# Patient Record
Sex: FEMALE | Race: WHITE | Employment: OTHER | URBAN - METROPOLITAN AREA
[De-identification: names, ages, dates, MRNs, and addresses within clinical notes are randomized per-mention and may not be internally consistent; named-entity substitution may affect disease eponyms.]

---

## 2017-01-09 ENCOUNTER — TELEPHONE (OUTPATIENT)
Dept: PSYCHIATRY | Age: 82
End: 2017-01-09

## 2017-01-09 ENCOUNTER — OFFICE VISIT (OUTPATIENT)
Dept: INTERNAL MEDICINE CLINIC | Age: 82
End: 2017-01-09

## 2017-01-09 VITALS
HEART RATE: 72 BPM | SYSTOLIC BLOOD PRESSURE: 136 MMHG | HEIGHT: 61 IN | BODY MASS INDEX: 18.54 KG/M2 | WEIGHT: 98.2 LBS | DIASTOLIC BLOOD PRESSURE: 72 MMHG

## 2017-01-09 DIAGNOSIS — M81.0 OSTEOPOROSIS: ICD-10-CM

## 2017-01-09 DIAGNOSIS — J43.2 CENTRILOBULAR EMPHYSEMA (HCC): ICD-10-CM

## 2017-01-09 DIAGNOSIS — I73.9 PERIPHERAL VASCULAR DISEASE (HCC): ICD-10-CM

## 2017-01-09 DIAGNOSIS — C34.91 PRIMARY ADENOCARCINOMA OF RIGHT LUNG (HCC): ICD-10-CM

## 2017-01-09 DIAGNOSIS — J44.9 OBSTRUCTIVE CHRONIC BRONCHITIS WITHOUT EXACERBATION (HCC): ICD-10-CM

## 2017-01-09 DIAGNOSIS — F17.200 TOBACCO USE DISORDER: ICD-10-CM

## 2017-01-09 DIAGNOSIS — J31.0 CHRONIC RHINITIS: ICD-10-CM

## 2017-01-09 DIAGNOSIS — Z79.899 HIGH RISK MEDICATION USE: ICD-10-CM

## 2017-01-09 DIAGNOSIS — J44.9 COPD, SEVERE (HCC): Primary | ICD-10-CM

## 2017-01-09 DIAGNOSIS — M79.2 NEUROPATHIC PAIN DUE TO RADIATION: ICD-10-CM

## 2017-01-09 DIAGNOSIS — E78.2 MIXED HYPERLIPIDEMIA: ICD-10-CM

## 2017-01-09 DIAGNOSIS — I71.40 ABDOMINAL AORTIC ANEURYSM WITHOUT RUPTURE: ICD-10-CM

## 2017-01-09 LAB
BASOPHILS ABSOLUTE: 0 K/UL (ref 0–0.2)
BASOPHILS RELATIVE PERCENT: 0.3 %
EOSINOPHILS ABSOLUTE: 0.1 K/UL (ref 0–0.6)
EOSINOPHILS RELATIVE PERCENT: 1.1 %
HCT VFR BLD CALC: 43.3 % (ref 36–48)
HEMOGLOBIN: 14.3 G/DL (ref 12–16)
LYMPHOCYTES ABSOLUTE: 1.7 K/UL (ref 1–5.1)
LYMPHOCYTES RELATIVE PERCENT: 19.8 %
MCH RBC QN AUTO: 29.5 PG (ref 26–34)
MCHC RBC AUTO-ENTMCNC: 32.9 G/DL (ref 31–36)
MCV RBC AUTO: 89.8 FL (ref 80–100)
MONOCYTES ABSOLUTE: 0.6 K/UL (ref 0–1.3)
MONOCYTES RELATIVE PERCENT: 7.2 %
NEUTROPHILS ABSOLUTE: 6.3 K/UL (ref 1.7–7.7)
NEUTROPHILS RELATIVE PERCENT: 71.6 %
PDW BLD-RTO: 13.6 % (ref 12.4–15.4)
PLATELET # BLD: 210 K/UL (ref 135–450)
PMV BLD AUTO: 9.1 FL (ref 5–10.5)
RBC # BLD: 4.83 M/UL (ref 4–5.2)
VITAMIN D 25-HYDROXY: 44.3 NG/ML
WBC # BLD: 8.8 K/UL (ref 4–11)

## 2017-01-09 PROCEDURE — 99214 OFFICE O/P EST MOD 30 MIN: CPT | Performed by: FAMILY MEDICINE

## 2017-01-09 RX ORDER — DOXYCYCLINE 100 MG/1
100 CAPSULE ORAL 2 TIMES DAILY
Qty: 20 CAPSULE | Refills: 0 | Status: SHIPPED | OUTPATIENT
Start: 2017-01-09 | End: 2017-01-11

## 2017-01-09 RX ORDER — MOMETASONE FUROATE 50 UG/1
SPRAY, METERED NASAL
Qty: 1 INHALER | Refills: 5 | Status: SHIPPED | OUTPATIENT
Start: 2017-01-09 | End: 2017-04-03

## 2017-01-09 RX ORDER — FLUTICASONE PROPIONATE 50 MCG
SPRAY, SUSPENSION (ML) NASAL
Qty: 1 BOTTLE | Refills: 12 | Status: SHIPPED | OUTPATIENT
Start: 2017-01-09 | End: 2017-07-10 | Stop reason: ALTCHOICE

## 2017-01-09 ASSESSMENT — ENCOUNTER SYMPTOMS
VOMITING: 0
CONSTIPATION: 0
NAUSEA: 0
DIARRHEA: 0

## 2017-01-10 PROBLEM — K75.81 NONALCOHOLIC STEATOHEPATITIS (NASH): Status: ACTIVE | Noted: 2017-01-10

## 2017-01-10 PROBLEM — C34.91 PRIMARY ADENOCARCINOMA OF RIGHT LUNG (HCC): Status: ACTIVE | Noted: 2017-01-10

## 2017-01-10 LAB
A/G RATIO: 1.6 (ref 1.1–2.2)
ALBUMIN SERPL-MCNC: 4.3 G/DL (ref 3.4–5)
ALP BLD-CCNC: 128 U/L (ref 40–129)
ALT SERPL-CCNC: 23 U/L (ref 10–40)
ANION GAP SERPL CALCULATED.3IONS-SCNC: 18 MMOL/L (ref 3–16)
AST SERPL-CCNC: 52 U/L (ref 15–37)
BILIRUB SERPL-MCNC: 0.5 MG/DL (ref 0–1)
BUN BLDV-MCNC: 9 MG/DL (ref 7–20)
CALCIUM SERPL-MCNC: 9.9 MG/DL (ref 8.3–10.6)
CHLORIDE BLD-SCNC: 98 MMOL/L (ref 99–110)
CHOLESTEROL, TOTAL: 122 MG/DL (ref 0–199)
CO2: 25 MMOL/L (ref 21–32)
CREAT SERPL-MCNC: 0.7 MG/DL (ref 0.6–1.2)
GFR AFRICAN AMERICAN: >60
GFR NON-AFRICAN AMERICAN: >60
GLOBULIN: 2.7 G/DL
GLUCOSE BLD-MCNC: 93 MG/DL (ref 70–99)
HDLC SERPL-MCNC: 46 MG/DL (ref 40–60)
LDL CHOLESTEROL CALCULATED: 47 MG/DL
POTASSIUM SERPL-SCNC: 4.6 MMOL/L (ref 3.5–5.1)
SODIUM BLD-SCNC: 141 MMOL/L (ref 136–145)
TOTAL PROTEIN: 7 G/DL (ref 6.4–8.2)
TRIGL SERPL-MCNC: 144 MG/DL (ref 0–150)
VLDLC SERPL CALC-MCNC: 29 MG/DL

## 2017-01-10 ASSESSMENT — ENCOUNTER SYMPTOMS
SORE THROAT: 0
RHINORRHEA: 1
SHORTNESS OF BREATH: 1
FACIAL SWELLING: 0
WHEEZING: 0
COUGH: 1
CHEST TIGHTNESS: 0
SINUS PRESSURE: 0

## 2017-01-11 ENCOUNTER — TELEPHONE (OUTPATIENT)
Dept: INTERNAL MEDICINE CLINIC | Age: 82
End: 2017-01-11

## 2017-02-13 RX ORDER — TIOTROPIUM BROMIDE 18 UG/1
CAPSULE ORAL; RESPIRATORY (INHALATION)
Qty: 30 CAPSULE | Refills: 6 | Status: SHIPPED | OUTPATIENT
Start: 2017-02-13 | End: 2017-07-10 | Stop reason: ALTCHOICE

## 2017-02-28 ENCOUNTER — CLINICAL DOCUMENTATION (OUTPATIENT)
Dept: INTERNAL MEDICINE CLINIC | Age: 82
End: 2017-02-28

## 2017-02-28 NOTE — PROGRESS NOTES
I am, Mary Galloway, a Medical Assistant with [facility]. We are working to help abstract data from payers. A Home visit evaluation was obtained from [facility]. In reviewing the visit information are discrepancy(s) in [facility] record. I've also scanned this to  for your review.    The following item(s) were noted to be different on the home visit report compared to our data in Epic:  1. diagnosis        Thank you,   Nate Quevedo

## 2017-04-03 ENCOUNTER — OFFICE VISIT (OUTPATIENT)
Dept: INTERNAL MEDICINE CLINIC | Age: 82
End: 2017-04-03

## 2017-04-03 VITALS
DIASTOLIC BLOOD PRESSURE: 92 MMHG | WEIGHT: 100 LBS | HEART RATE: 80 BPM | HEIGHT: 60 IN | BODY MASS INDEX: 19.63 KG/M2 | SYSTOLIC BLOOD PRESSURE: 140 MMHG

## 2017-04-03 DIAGNOSIS — C34.2 PRIMARY CANCER OF RIGHT MIDDLE LOBE OF LUNG (HCC): ICD-10-CM

## 2017-04-03 DIAGNOSIS — E78.2 MIXED HYPERLIPIDEMIA: ICD-10-CM

## 2017-04-03 DIAGNOSIS — J44.9 COPD, SEVERE (HCC): ICD-10-CM

## 2017-04-03 DIAGNOSIS — M79.2 NEUROPATHIC PAIN DUE TO RADIATION: Primary | ICD-10-CM

## 2017-04-03 DIAGNOSIS — J96.11 CHRONIC HYPOXEMIC RESPIRATORY FAILURE (HCC): ICD-10-CM

## 2017-04-03 DIAGNOSIS — F41.1 GAD (GENERALIZED ANXIETY DISORDER): ICD-10-CM

## 2017-04-03 DIAGNOSIS — C34.91 PRIMARY ADENOCARCINOMA OF RIGHT LUNG (HCC): ICD-10-CM

## 2017-04-03 PROCEDURE — 99213 OFFICE O/P EST LOW 20 MIN: CPT | Performed by: FAMILY MEDICINE

## 2017-04-03 RX ORDER — MIRTAZAPINE 7.5 MG/1
TABLET, FILM COATED ORAL
Qty: 90 TABLET | Refills: 1 | Status: SHIPPED | OUTPATIENT
Start: 2017-04-03 | End: 2017-06-23 | Stop reason: SDUPTHER

## 2017-04-03 ASSESSMENT — ENCOUNTER SYMPTOMS
VOMITING: 0
SHORTNESS OF BREATH: 0
NAUSEA: 0
DIARRHEA: 0
CONSTIPATION: 0

## 2017-04-04 ENCOUNTER — HOSPITAL ENCOUNTER (OUTPATIENT)
Dept: GENERAL RADIOLOGY | Age: 82
Discharge: HOME | End: 2017-04-04
Attending: FAMILY MEDICINE | Admitting: FAMILY MEDICINE

## 2017-04-04 DIAGNOSIS — M81.0 OSTEOPOROSIS: ICD-10-CM

## 2017-04-04 DIAGNOSIS — M81.0 AGE-RELATED OSTEOPOROSIS WITHOUT CURRENT PATHOLOGICAL FRACTURE: ICD-10-CM

## 2017-04-19 ENCOUNTER — OFFICE VISIT (OUTPATIENT)
Dept: PULMONOLOGY | Age: 82
End: 2017-04-19

## 2017-04-19 ENCOUNTER — HOSPITAL ENCOUNTER (OUTPATIENT)
Dept: OTHER | Age: 82
Discharge: HOME | End: 2017-04-19
Attending: INTERNAL MEDICINE | Admitting: INTERNAL MEDICINE

## 2017-04-19 VITALS — HEART RATE: 100 BPM | DIASTOLIC BLOOD PRESSURE: 72 MMHG | SYSTOLIC BLOOD PRESSURE: 138 MMHG | OXYGEN SATURATION: 95 %

## 2017-04-19 DIAGNOSIS — J43.2 CENTRILOBULAR EMPHYSEMA (HCC): ICD-10-CM

## 2017-04-19 DIAGNOSIS — F17.200 TOBACCO USE DISORDER: Chronic | ICD-10-CM

## 2017-04-19 DIAGNOSIS — R06.02 SOB (SHORTNESS OF BREATH): Primary | ICD-10-CM

## 2017-04-19 DIAGNOSIS — C34.2 PRIMARY CANCER OF RIGHT MIDDLE LOBE OF LUNG (HCC): ICD-10-CM

## 2017-04-19 DIAGNOSIS — R06.02 SOB (SHORTNESS OF BREATH): ICD-10-CM

## 2017-04-19 DIAGNOSIS — J44.9 COPD, SEVERE (HCC): ICD-10-CM

## 2017-04-19 LAB
BASOPHILS ABSOLUTE: 0.1 K/UL (ref 0–0.2)
BASOPHILS RELATIVE PERCENT: 0.7 %
EOSINOPHILS ABSOLUTE: 0.1 K/UL (ref 0–0.6)
EOSINOPHILS RELATIVE PERCENT: 0.7 %
HCT VFR BLD CALC: 46.7 % (ref 36–48)
HEMOGLOBIN: 15.3 G/DL (ref 12–16)
LYMPHOCYTES ABSOLUTE: 1.8 K/UL (ref 1–5.1)
LYMPHOCYTES RELATIVE PERCENT: 22 %
MCH RBC QN AUTO: 29.4 PG (ref 26–34)
MCHC RBC AUTO-ENTMCNC: 32.7 G/DL (ref 31–36)
MCV RBC AUTO: 89.9 FL (ref 80–100)
MONOCYTES ABSOLUTE: 0.8 K/UL (ref 0–1.3)
MONOCYTES RELATIVE PERCENT: 10.4 %
NEUTROPHILS ABSOLUTE: 5.4 K/UL (ref 1.7–7.7)
NEUTROPHILS RELATIVE PERCENT: 66.2 %
PDW BLD-RTO: 13.7 % (ref 12.4–15.4)
PLATELET # BLD: 212 K/UL (ref 135–450)
PMV BLD AUTO: 9.4 FL (ref 5–10.5)
RBC # BLD: 5.19 M/UL (ref 4–5.2)
WBC # BLD: 8.1 K/UL (ref 4–11)

## 2017-04-19 PROCEDURE — 99214 OFFICE O/P EST MOD 30 MIN: CPT | Performed by: INTERNAL MEDICINE

## 2017-04-19 RX ORDER — PREDNISONE 10 MG/1
TABLET ORAL
Qty: 30 TABLET | Refills: 0 | Status: SHIPPED | OUTPATIENT
Start: 2017-04-19 | End: 2017-04-29

## 2017-04-19 RX ORDER — DOXYCYCLINE HYCLATE 100 MG/1
100 CAPSULE ORAL DAILY
Qty: 10 CAPSULE | Refills: 3 | Status: SHIPPED | OUTPATIENT
Start: 2017-04-19 | End: 2017-04-29

## 2017-05-28 DIAGNOSIS — F41.1 GAD (GENERALIZED ANXIETY DISORDER): ICD-10-CM

## 2017-05-30 RX ORDER — MIRTAZAPINE 7.5 MG/1
TABLET, FILM COATED ORAL
Qty: 90 TABLET | Refills: 0 | Status: SHIPPED | OUTPATIENT
Start: 2017-05-30 | End: 2018-04-20 | Stop reason: DRUGHIGH

## 2017-06-24 PROBLEM — K59.00 CONSTIPATION: Status: ACTIVE | Noted: 2017-06-24

## 2017-06-26 ENCOUNTER — TELEPHONE (OUTPATIENT)
Dept: INTERNAL MEDICINE CLINIC | Age: 82
End: 2017-06-26

## 2017-06-29 ENCOUNTER — TELEPHONE (OUTPATIENT)
Dept: INTERNAL MEDICINE CLINIC | Age: 82
End: 2017-06-29

## 2017-07-10 ENCOUNTER — OFFICE VISIT (OUTPATIENT)
Dept: INTERNAL MEDICINE CLINIC | Age: 82
End: 2017-07-10

## 2017-07-10 VITALS
BODY MASS INDEX: 18.03 KG/M2 | HEART RATE: 80 BPM | DIASTOLIC BLOOD PRESSURE: 82 MMHG | WEIGHT: 98 LBS | SYSTOLIC BLOOD PRESSURE: 130 MMHG | HEIGHT: 62 IN

## 2017-07-10 DIAGNOSIS — M79.2 NEUROPATHIC PAIN DUE TO RADIATION: ICD-10-CM

## 2017-07-10 DIAGNOSIS — J31.0 CHRONIC RHINITIS: ICD-10-CM

## 2017-07-10 DIAGNOSIS — J44.9 COPD, SEVERE (HCC): ICD-10-CM

## 2017-07-10 DIAGNOSIS — Z09 HOSPITAL DISCHARGE FOLLOW-UP: ICD-10-CM

## 2017-07-10 DIAGNOSIS — F17.200 TOBACCO USE DISORDER: Chronic | ICD-10-CM

## 2017-07-10 DIAGNOSIS — F41.1 GAD (GENERALIZED ANXIETY DISORDER): ICD-10-CM

## 2017-07-10 DIAGNOSIS — R10.13 DYSPEPSIA: ICD-10-CM

## 2017-07-10 DIAGNOSIS — E78.2 MIXED HYPERLIPIDEMIA: ICD-10-CM

## 2017-07-10 DIAGNOSIS — C34.91 PRIMARY ADENOCARCINOMA OF RIGHT LUNG (HCC): ICD-10-CM

## 2017-07-10 DIAGNOSIS — K56.41 FECAL IMPACTION (HCC): Primary | ICD-10-CM

## 2017-07-10 PROCEDURE — 99214 OFFICE O/P EST MOD 30 MIN: CPT | Performed by: FAMILY MEDICINE

## 2017-07-10 RX ORDER — MONTELUKAST SODIUM 10 MG/1
TABLET ORAL
Qty: 90 TABLET | Refills: 2 | Status: SHIPPED | OUTPATIENT
Start: 2017-07-10 | End: 2018-04-20

## 2017-07-10 RX ORDER — BUSPIRONE HYDROCHLORIDE 15 MG/1
TABLET ORAL
Qty: 135 TABLET | Refills: 1 | Status: SHIPPED | OUTPATIENT
Start: 2017-07-10 | End: 2018-04-01 | Stop reason: SDUPTHER

## 2017-07-10 RX ORDER — MOMETASONE FUROATE 50 UG/1
2 SPRAY, METERED NASAL DAILY
Qty: 1 INHALER | Refills: 3 | Status: SHIPPED | OUTPATIENT
Start: 2017-07-10 | End: 2018-01-26 | Stop reason: SINTOL

## 2017-07-10 RX ORDER — FAMOTIDINE 20 MG/1
TABLET, FILM COATED ORAL
Qty: 90 TABLET | Refills: 2 | Status: SHIPPED | OUTPATIENT
Start: 2017-07-10 | End: 2018-07-04 | Stop reason: SDUPTHER

## 2017-07-10 RX ORDER — POLYETHYLENE GLYCOL 3350 17 G
POWDER IN PACKET (EA) ORAL
Qty: 100 EACH | Refills: 3 | Status: SHIPPED | OUTPATIENT
Start: 2017-07-10 | End: 2018-01-26

## 2017-07-10 RX ORDER — SIMVASTATIN 40 MG
TABLET ORAL
Qty: 90 TABLET | Refills: 1 | Status: SHIPPED | OUTPATIENT
Start: 2017-07-10 | End: 2018-04-02 | Stop reason: SDUPTHER

## 2017-07-10 ASSESSMENT — ENCOUNTER SYMPTOMS
COUGH: 0
SHORTNESS OF BREATH: 0
CONSTIPATION: 1
DIARRHEA: 0
WHEEZING: 0
VOMITING: 0
CHEST TIGHTNESS: 0
ABDOMINAL DISTENTION: 0
NAUSEA: 0

## 2017-07-10 ASSESSMENT — PATIENT HEALTH QUESTIONNAIRE - PHQ9
SUM OF ALL RESPONSES TO PHQ9 QUESTIONS 1 & 2: 0
1. LITTLE INTEREST OR PLEASURE IN DOING THINGS: 0
2. FEELING DOWN, DEPRESSED OR HOPELESS: 0
SUM OF ALL RESPONSES TO PHQ QUESTIONS 1-9: 0

## 2017-08-10 ENCOUNTER — TELEPHONE (OUTPATIENT)
Dept: INTERNAL MEDICINE CLINIC | Age: 82
End: 2017-08-10

## 2017-08-10 ENCOUNTER — HOSPITAL ENCOUNTER (OUTPATIENT)
Dept: CT IMAGING | Age: 82
Discharge: HOME | End: 2017-08-10
Attending: RADIOLOGY | Admitting: RADIOLOGY

## 2017-08-10 DIAGNOSIS — C34.2 MALIGNANT NEOPLASM OF MIDDLE LOBE, BRONCHUS OR LUNG (HCC): ICD-10-CM

## 2017-08-10 DIAGNOSIS — C34.2 PRIMARY CANCER OF RIGHT MIDDLE LOBE OF LUNG (HCC): ICD-10-CM

## 2017-08-26 DIAGNOSIS — F41.1 GAD (GENERALIZED ANXIETY DISORDER): ICD-10-CM

## 2017-08-28 RX ORDER — MIRTAZAPINE 7.5 MG/1
TABLET, FILM COATED ORAL
Qty: 90 TABLET | Refills: 0 | Status: SHIPPED | OUTPATIENT
Start: 2017-08-28 | End: 2017-11-01 | Stop reason: SDUPTHER

## 2017-10-25 ENCOUNTER — OFFICE VISIT (OUTPATIENT)
Dept: PULMONOLOGY | Age: 82
End: 2017-10-25

## 2017-10-25 VITALS — DIASTOLIC BLOOD PRESSURE: 77 MMHG | HEART RATE: 103 BPM | OXYGEN SATURATION: 88 % | SYSTOLIC BLOOD PRESSURE: 144 MMHG

## 2017-10-25 DIAGNOSIS — C34.2 PRIMARY CANCER OF RIGHT MIDDLE LOBE OF LUNG (HCC): ICD-10-CM

## 2017-10-25 DIAGNOSIS — R06.02 SOB (SHORTNESS OF BREATH): Primary | ICD-10-CM

## 2017-10-25 DIAGNOSIS — J44.9 COPD, SEVERE (HCC): ICD-10-CM

## 2017-10-25 DIAGNOSIS — J43.2 CENTRILOBULAR EMPHYSEMA (HCC): ICD-10-CM

## 2017-10-25 DIAGNOSIS — J96.11 CHRONIC HYPOXEMIC RESPIRATORY FAILURE (HCC): ICD-10-CM

## 2017-10-25 PROCEDURE — G8427 DOCREV CUR MEDS BY ELIG CLIN: HCPCS | Performed by: INTERNAL MEDICINE

## 2017-10-25 PROCEDURE — 4004F PT TOBACCO SCREEN RCVD TLK: CPT | Performed by: INTERNAL MEDICINE

## 2017-10-25 PROCEDURE — 4040F PNEUMOC VAC/ADMIN/RCVD: CPT | Performed by: INTERNAL MEDICINE

## 2017-10-25 PROCEDURE — G8926 SPIRO NO PERF OR DOC: HCPCS | Performed by: INTERNAL MEDICINE

## 2017-10-25 PROCEDURE — 3023F SPIROM DOC REV: CPT | Performed by: INTERNAL MEDICINE

## 2017-10-25 PROCEDURE — G8484 FLU IMMUNIZE NO ADMIN: HCPCS | Performed by: INTERNAL MEDICINE

## 2017-10-25 PROCEDURE — G0008 ADMIN INFLUENZA VIRUS VAC: HCPCS | Performed by: INTERNAL MEDICINE

## 2017-10-25 PROCEDURE — 90662 IIV NO PRSV INCREASED AG IM: CPT | Performed by: INTERNAL MEDICINE

## 2017-10-25 PROCEDURE — 1123F ACP DISCUSS/DSCN MKR DOCD: CPT | Performed by: INTERNAL MEDICINE

## 2017-10-25 PROCEDURE — G8418 CALC BMI BLW LOW PARAM F/U: HCPCS | Performed by: INTERNAL MEDICINE

## 2017-10-25 PROCEDURE — G8399 PT W/DXA RESULTS DOCUMENT: HCPCS | Performed by: INTERNAL MEDICINE

## 2017-10-25 PROCEDURE — 99214 OFFICE O/P EST MOD 30 MIN: CPT | Performed by: INTERNAL MEDICINE

## 2017-10-25 PROCEDURE — 1090F PRES/ABSN URINE INCON ASSESS: CPT | Performed by: INTERNAL MEDICINE

## 2017-10-25 NOTE — PROGRESS NOTES
[facility]  Progress Note  Maria E Lock MD       Chemo Freitas   YOB: 1935    Date of Visit:  10/25/2017    Assessment/Plan:  1. SOB (shortness of breath)  More SOB and having fever with pleuritic chest pain  Concerned that she may have PNA  CXR and CBC today  Doxy  Pred taper  Hospital if she gets worse or does not improve  She does not appear toxic at the moment -- SpO2, BP and Pulse are Ok. She is afebrile. 2. COPD, severe ([address]  PFT 4/16:  Spirometry showed normal FVC of 2.03 L, 87% predicted and decreased FEV1 of  0.86 L, 50% predicted. FEV1/FVC ratio was decreased at 43%. Positive  response to bronchodilators demonstrated on spirometry.      IMPRESSION: Moderate to severe obstructive defect on spirometry with  positive response to bronchodilators. In comparison to the test that was done  in May 2014 FVC has improved by 15% and FEV1 by 15% as well. Advair 250/50 b.i.d. Spiriva once daily (tried Incruse which seemed to help at first but then she felt \"strange\" and quit using it) She is willing to try it again. Albuterol as needed    3. Centrilobular emphysema ([address]  CT chest 8/16:  Impression   1. Stable geographic parenchymal opacities in the right middle and lower lobe   likely sequela of previous radiation therapy. 2. Advanced centrilobular emphysema. 3. Aneurysm of the descending thoracic aorta     stable    4. Chronic hypoxemic respiratory failure (HCC)  O2 sats are acceptable on supplemental O2.   The patient benefits from the use of supplemental O2.      5. Primary cancer of right middle lobe of lung ([address]  Adenocarcinoma status post XRT    CT chest 8/17:  Impression   Re- demonstration of lateral right middle lobe mass and right lower lobe   mass.  Rib erosion is seen involving at least 3 ribs adjacent to the right   middle lobe mass.  These findings are stable as are the enlarged mediastinal   lymph nodes.  Overall no new HANDIHALER) 18 MCG inhalation capsule INHALE THE CONTENTS OF 1 CAPSULE VIA INHALATION DEVICE DAILY INTO THE LUNGS  Clifford Gosselin, MD   mirtazapine (REMERON) 7.5 MG tablet TAKE 1 TABLET BY MOUTH EVERY NIGHT  Clifford Gosselin, MD   traMADol-acetaminophen (ULTRACET) 37.5-325 MG per tablet TAKE 1-2 TABLETS BY MOUTH EVERY 4-6 HOURS. MAX OF 8 PER DAY. Clifford Gosselin, MD   montelukast (SINGULAIR) 10 MG tablet TAKE 1 TABLET BY MOUTH EVERY NIGHT AT BEDTIME  Clifford Gosselin, MD   famotidine (PEPCID) 20 MG tablet TAKE 1 TABLET BY MOUTH DAILY  Clifford Gosselin, MD   busPIRone (BUSPAR) 15 MG tablet TAKE 1/2 TABLET BY MOUTH EVERY MORNING AND 1 TABLET BY MOUTH EVERY Josselyn MD Angela   simvastatin (ZOCOR) 40 MG tablet TAKE 1 TABLET BY MOUTH DAILY  Clifford Gosselin, MD   mometasone (NASONEX) 50 MCG/ACT nasal spray 2 sprays by Nasal route daily  Clifford Gosselin, MD   nicotine polacrilex (NICORETTE MINI) 2 MG lozenge May take 1 lozenge as needed when you crave a cigarette. Up to 10 per day. Clifford Gosselin, MD   mirtazapine (REMERON) 7.5 MG tablet TAKE 1 TABLET BY MOUTH EVERY NIGHT  Clifford Gosselin, MD   fluticasone-salmeterol (ADVAIR DISKUS) 250-50 MCG/DOSE AEPB INHALE 1 PUFF BY MOUTH TWICE DAILY  Clifford Gosselin, MD   azelastine (ASTELIN) 0.1 % nasal spray 1 spray by Nasal route 2 times daily Use in each nostril as directed  Clifford Gosselin, MD   senna-docusate (SENOKOT S) 8.6-50 MG per tablet Take 1-2 tablets every day due to constipation from pain medication. Clifford Gosselin, MD   OXYGEN Inhale into the lungs 3 L AT NIGHT AND PRN  Historical Provider, MD   aspirin EC 81 MG EC tablet Take 1 tablet by mouth daily. With a meal.  Clifford Gosselin, MD   vitamin B-12 (CYANOCOBALAMIN) 1000 MCG tablet Take 1,000 mcg by mouth daily. Historical Provider, MD   VITAMIN D-3 (COLECALCIFEROL) 400 UNITS TABS Take  by mouth daily.   Historical Provider, MD   Bimatoprost (LUMIGAN) 0.01 % SOLN Apply  to eye daily. Daily in rt eye   Historical Provider, MD       Vitals:    10/25/17 1431   BP: (!) 148/74   Pulse: 103   SpO2: (!) 88%     There is no height or weight on file to calculate BMI.      Wt Readings from Last 3 Encounters:   08/14/17 101 lb (45.8 kg)   07/10/17 98 lb (44.5 kg)   06/23/17 87 lb 11.2 oz (39.8 kg)     BP Readings from Last 3 Encounters:   10/25/17 (!) 148/74   08/14/17 (!) 145/84   07/10/17 130/82        History   Smoking Status    Current Every Day Smoker    Packs/day: 1.00    Years: 60.00    Types: Cigarettes   Smokeless Tobacco    Never Used     Comment: stated she's cut down to 1/4-1/2 pack

## 2017-11-01 ENCOUNTER — OFFICE VISIT (OUTPATIENT)
Dept: INTERNAL MEDICINE CLINIC | Age: 82
End: 2017-11-01

## 2017-11-01 VITALS
DIASTOLIC BLOOD PRESSURE: 70 MMHG | SYSTOLIC BLOOD PRESSURE: 130 MMHG | BODY MASS INDEX: 18.77 KG/M2 | HEIGHT: 62 IN | WEIGHT: 102 LBS | HEART RATE: 92 BPM

## 2017-11-01 DIAGNOSIS — M79.2 NEUROPATHIC PAIN DUE TO RADIATION: ICD-10-CM

## 2017-11-01 DIAGNOSIS — F17.200 TOBACCO USE DISORDER: Chronic | ICD-10-CM

## 2017-11-01 DIAGNOSIS — G89.3 CHRONIC PAIN DUE TO NEOPLASM: Primary | ICD-10-CM

## 2017-11-01 DIAGNOSIS — N39.41 URGENCY INCONTINENCE: ICD-10-CM

## 2017-11-01 DIAGNOSIS — Z79.891 USE OF OPIATES FOR THERAPEUTIC PURPOSES: ICD-10-CM

## 2017-11-01 DIAGNOSIS — J44.9 COPD, SEVERE (HCC): ICD-10-CM

## 2017-11-01 LAB
BILIRUBIN URINE: NEGATIVE
BLOOD, URINE: ABNORMAL
CLARITY: CLEAR
COLOR: YELLOW
EPITHELIAL CELLS, UA: 0 /HPF (ref 0–5)
GLUCOSE URINE: NEGATIVE MG/DL
HYALINE CASTS: 0 /LPF (ref 0–8)
KETONES, URINE: NEGATIVE MG/DL
LEUKOCYTE ESTERASE, URINE: NEGATIVE
MICROSCOPIC EXAMINATION: YES
NITRITE, URINE: NEGATIVE
PH UA: 7
PROTEIN UA: 30 MG/DL
RBC UA: 16 /HPF (ref 0–4)
SPECIFIC GRAVITY UA: 1.01
UROBILINOGEN, URINE: 0.2 E.U./DL
WBC UA: 1 /HPF (ref 0–5)

## 2017-11-01 PROCEDURE — G8427 DOCREV CUR MEDS BY ELIG CLIN: HCPCS | Performed by: FAMILY MEDICINE

## 2017-11-01 PROCEDURE — G8420 CALC BMI NORM PARAMETERS: HCPCS | Performed by: FAMILY MEDICINE

## 2017-11-01 PROCEDURE — G8926 SPIRO NO PERF OR DOC: HCPCS | Performed by: FAMILY MEDICINE

## 2017-11-01 PROCEDURE — G8399 PT W/DXA RESULTS DOCUMENT: HCPCS | Performed by: FAMILY MEDICINE

## 2017-11-01 PROCEDURE — 1123F ACP DISCUSS/DSCN MKR DOCD: CPT | Performed by: FAMILY MEDICINE

## 2017-11-01 PROCEDURE — 1090F PRES/ABSN URINE INCON ASSESS: CPT | Performed by: FAMILY MEDICINE

## 2017-11-01 PROCEDURE — 3023F SPIROM DOC REV: CPT | Performed by: FAMILY MEDICINE

## 2017-11-01 PROCEDURE — 0509F URINE INCON PLAN DOCD: CPT | Performed by: FAMILY MEDICINE

## 2017-11-01 PROCEDURE — 4040F PNEUMOC VAC/ADMIN/RCVD: CPT | Performed by: FAMILY MEDICINE

## 2017-11-01 PROCEDURE — 4004F PT TOBACCO SCREEN RCVD TLK: CPT | Performed by: FAMILY MEDICINE

## 2017-11-01 PROCEDURE — G8484 FLU IMMUNIZE NO ADMIN: HCPCS | Performed by: FAMILY MEDICINE

## 2017-11-01 PROCEDURE — 99214 OFFICE O/P EST MOD 30 MIN: CPT | Performed by: FAMILY MEDICINE

## 2017-11-01 NOTE — PROGRESS NOTES
vascular disease ([facility]    Abdominal pain    Right lower lobe lung mass    History of kidney stones    Hydronephrosis of right kidney    Abdominal aortic aneurysm without rupture ([facility]    Encounter for follow-up surveillance of lung cancer    COPD, severe ([facility]    Chronic hypoxemic respiratory failure (HCC)    Dyspepsia    SOB (shortness of breath)    Centrilobular emphysema (HCC)    Nonalcoholic steatohepatitis (REES)    Constipation         Outpatient Prescriptions Marked as Taking for the 11/1/17 encounter (Office Visit) with Merari Bowling MD   Medication Sig Dispense Refill    tiotropium (SPIRIVA HANDIHALER) 18 MCG inhalation capsule INHALE THE CONTENTS OF 1 CAPSULE VIA INHALATION DEVICE DAILY INTO THE LUNGS 30 capsule 6    traMADol-acetaminophen (ULTRACET) 37.5-325 MG per tablet TAKE 1-2 TABLETS BY MOUTH EVERY 4-6 HOURS. MAX OF 8 PER DAY. 90 tablet 1    montelukast (SINGULAIR) 10 MG tablet TAKE 1 TABLET BY MOUTH EVERY NIGHT AT BEDTIME 90 tablet 2    famotidine (PEPCID) 20 MG tablet TAKE 1 TABLET BY MOUTH DAILY 90 tablet 2    busPIRone (BUSPAR) 15 MG tablet TAKE 1/2 TABLET BY MOUTH EVERY MORNING AND 1 TABLET BY MOUTH EVERY EVENING 135 tablet 1    simvastatin (ZOCOR) 40 MG tablet TAKE 1 TABLET BY MOUTH DAILY 90 tablet 1    mometasone (NASONEX) 50 MCG/ACT nasal spray 2 sprays by Nasal route daily 1 Inhaler 3    mirtazapine (REMERON) 7.5 MG tablet TAKE 1 TABLET BY MOUTH EVERY NIGHT 90 tablet 0    fluticasone-salmeterol (ADVAIR DISKUS) 250-50 MCG/DOSE AEPB INHALE 1 PUFF BY MOUTH TWICE DAILY 1 Inhaler 5    azelastine (ASTELIN) 0.1 % nasal spray 1 spray by Nasal route 2 times daily Use in each nostril as directed 1 Bottle 3    senna-docusate (SENOKOT S) 8.6-50 MG per tablet Take 1-2 tablets every day due to constipation from pain medication.  OXYGEN Inhale into the lungs 3 L AT NIGHT AND PRN      aspirin EC 81 MG EC tablet Take 1 tablet by mouth daily.  With a meal. 30 tablet 11    is warm and dry. She is not diaphoretic. No cyanosis. No pallor. Nails show no clubbing. Psychiatric: She has a normal mood and affect. Her speech is normal and behavior is normal. Cognition and memory are normal.   Vitals reviewed. Wt Readings from Last 3 Encounters:   11/01/17 102 lb (46.3 kg)   08/14/17 101 lb (45.8 kg)   07/10/17 98 lb (44.5 kg)     Temp Readings from Last 3 Encounters:   08/14/17 98 °F (36.7 °C) (Oral)   06/26/17 97.7 °F (36.5 °C) (Oral)   08/15/16 98.5 °F (36.9 °C) (Oral)     BP Readings from Last 3 Encounters:   11/01/17 130/70   10/25/17 (!) 144/77   08/14/17 (!) 145/84     Pulse Readings from Last 3 Encounters:   11/01/17 92   10/25/17 103   08/14/17 94         Assessment:      1. Chronic pain due to neoplasm  Adequate control with current low dose narcotic.    - traMADol-acetaminophen (ULTRACET) 37.5-325 MG per tablet; TAKE 1-2 TABLETS BY MOUTH EVERY 4-6 HOURS. MAX OF 6 PER DAY. First fill date is 11/1/17 with 2 additional refills for a total of 90 days. Dispense: 90 tablet; Refill: 2    [facility]. [facility]  Reviewed pulmonology note. Has rales RLL but this is wear tumor is and had radiation. I don't think she has PNA or bacterial exacerbation and is mostly bothered by weather. Smoking cessation advised. 3. Urgency incontinence  Will check for UTI or crystals. dwp avoiding scott (drinks some every day) and increase water. - Urinalysis with Microscopic    4. 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 MAX OF 6 PER DAY. First fill date is 11/1/17 with 2 additional refills for a total of 90 days. Dispense: 90 tablet; Refill: 2    5. Tobacco use disorder  Encouraged to quit smoking. 6.  Opiate use for therapy. Controlled Substances Monitoring:     Attestation: The Prescription Monitoring Report for this patient was reviewed today.  Tiffanie Paredes MD)  Documentation: No

## 2017-11-04 PROBLEM — Z79.891 USE OF OPIATES FOR THERAPEUTIC PURPOSES: Status: ACTIVE | Noted: 2017-11-04

## 2017-11-04 ASSESSMENT — ENCOUNTER SYMPTOMS
DIARRHEA: 0
COUGH: 0
SHORTNESS OF BREATH: 1
NAUSEA: 0
VOMITING: 0

## 2017-12-05 ENCOUNTER — TELEPHONE (OUTPATIENT)
Dept: INTERNAL MEDICINE CLINIC | Age: 82
End: 2017-12-05

## 2017-12-05 RX ORDER — AZELASTINE 1 MG/ML
1 SPRAY, METERED NASAL 2 TIMES DAILY
Qty: 1 BOTTLE | Refills: 3 | Status: SHIPPED | OUTPATIENT
Start: 2017-12-05 | End: 2019-03-06 | Stop reason: SDUPTHER

## 2017-12-05 NOTE — TELEPHONE ENCOUNTER
Patient is requesting a prescription refill of azelastine (ASTELIN) 0.1 % nasal spray sent to [facility] on [facility] in [facility]. Please call patient when sent, she has someone that will  for her.

## 2018-01-26 ENCOUNTER — OFFICE VISIT (OUTPATIENT)
Dept: INTERNAL MEDICINE CLINIC | Age: 83
End: 2018-01-26

## 2018-01-26 VITALS
BODY MASS INDEX: 18.77 KG/M2 | SYSTOLIC BLOOD PRESSURE: 110 MMHG | WEIGHT: 102 LBS | HEIGHT: 62 IN | HEART RATE: 92 BPM | DIASTOLIC BLOOD PRESSURE: 80 MMHG

## 2018-01-26 DIAGNOSIS — J44.9 COPD, SEVERE (HCC): Primary | ICD-10-CM

## 2018-01-26 DIAGNOSIS — E78.2 MIXED HYPERLIPIDEMIA: ICD-10-CM

## 2018-01-26 DIAGNOSIS — E55.9 VITAMIN D DEFICIENCY: ICD-10-CM

## 2018-01-26 DIAGNOSIS — J96.11 CHRONIC HYPOXEMIC RESPIRATORY FAILURE (HCC): ICD-10-CM

## 2018-01-26 DIAGNOSIS — R63.6 UNDERWEIGHT DUE TO INADEQUATE CALORIC INTAKE: ICD-10-CM

## 2018-01-26 DIAGNOSIS — J43.2 CENTRILOBULAR EMPHYSEMA (HCC): ICD-10-CM

## 2018-01-26 DIAGNOSIS — I71.40 ABDOMINAL AORTIC ANEURYSM WITHOUT RUPTURE: ICD-10-CM

## 2018-01-26 DIAGNOSIS — I71.20 THORACIC AORTIC ANEURYSM WITHOUT RUPTURE: ICD-10-CM

## 2018-01-26 DIAGNOSIS — I73.9 PERIPHERAL VASCULAR DISEASE (HCC): Chronic | ICD-10-CM

## 2018-01-26 DIAGNOSIS — Z79.891 USE OF OPIATES FOR THERAPEUTIC PURPOSES: ICD-10-CM

## 2018-01-26 DIAGNOSIS — C34.2 PRIMARY CANCER OF RIGHT MIDDLE LOBE OF LUNG (HCC): ICD-10-CM

## 2018-01-26 DIAGNOSIS — F17.200 TOBACCO USE DISORDER: Chronic | ICD-10-CM

## 2018-01-26 DIAGNOSIS — R53.83 FATIGUE, UNSPECIFIED TYPE: ICD-10-CM

## 2018-01-26 DIAGNOSIS — G89.3 CHRONIC PAIN DUE TO NEOPLASM: ICD-10-CM

## 2018-01-26 LAB
A/G RATIO: 1.8 (ref 1.1–2.2)
ALBUMIN SERPL-MCNC: 4.4 G/DL (ref 3.4–5)
ALP BLD-CCNC: 110 U/L (ref 40–129)
ALT SERPL-CCNC: 11 U/L (ref 10–40)
ANION GAP SERPL CALCULATED.3IONS-SCNC: 17 MMOL/L (ref 3–16)
AST SERPL-CCNC: 25 U/L (ref 15–37)
BASOPHILS ABSOLUTE: 0.1 K/UL (ref 0–0.2)
BASOPHILS RELATIVE PERCENT: 0.6 %
BILIRUB SERPL-MCNC: 0.5 MG/DL (ref 0–1)
BUN BLDV-MCNC: 7 MG/DL (ref 7–20)
CALCIUM SERPL-MCNC: 9.4 MG/DL (ref 8.3–10.6)
CHLORIDE BLD-SCNC: 99 MMOL/L (ref 99–110)
CHOLESTEROL, TOTAL: 129 MG/DL (ref 0–199)
CO2: 27 MMOL/L (ref 21–32)
CREAT SERPL-MCNC: 0.6 MG/DL (ref 0.6–1.2)
EOSINOPHILS ABSOLUTE: 0.1 K/UL (ref 0–0.6)
EOSINOPHILS RELATIVE PERCENT: 1.2 %
GFR AFRICAN AMERICAN: >60
GFR NON-AFRICAN AMERICAN: >60
GLOBULIN: 2.5 G/DL
GLUCOSE BLD-MCNC: 87 MG/DL (ref 70–99)
HCT VFR BLD CALC: 42.1 % (ref 36–48)
HDLC SERPL-MCNC: 56 MG/DL (ref 40–60)
HEMOGLOBIN: 13.8 G/DL (ref 12–16)
LDL CHOLESTEROL CALCULATED: 46 MG/DL
LYMPHOCYTES ABSOLUTE: 1.9 K/UL (ref 1–5.1)
LYMPHOCYTES RELATIVE PERCENT: 20.8 %
MCH RBC QN AUTO: 29.6 PG (ref 26–34)
MCHC RBC AUTO-ENTMCNC: 32.9 G/DL (ref 31–36)
MCV RBC AUTO: 89.9 FL (ref 80–100)
MONOCYTES ABSOLUTE: 0.6 K/UL (ref 0–1.3)
MONOCYTES RELATIVE PERCENT: 7.2 %
NEUTROPHILS ABSOLUTE: 6.3 K/UL (ref 1.7–7.7)
NEUTROPHILS RELATIVE PERCENT: 70.2 %
PDW BLD-RTO: 14.4 % (ref 12.4–15.4)
PLATELET # BLD: 226 K/UL (ref 135–450)
PMV BLD AUTO: 9.1 FL (ref 5–10.5)
POTASSIUM SERPL-SCNC: 4.6 MMOL/L (ref 3.5–5.1)
RBC # BLD: 4.68 M/UL (ref 4–5.2)
SODIUM BLD-SCNC: 143 MMOL/L (ref 136–145)
TOTAL PROTEIN: 6.9 G/DL (ref 6.4–8.2)
TRIGL SERPL-MCNC: 136 MG/DL (ref 0–150)
TSH REFLEX: 2.54 UIU/ML (ref 0.27–4.2)
VITAMIN D 25-HYDROXY: 52.2 NG/ML
VLDLC SERPL CALC-MCNC: 27 MG/DL
WBC # BLD: 9 K/UL (ref 4–11)

## 2018-01-26 PROCEDURE — 1090F PRES/ABSN URINE INCON ASSESS: CPT | Performed by: FAMILY MEDICINE

## 2018-01-26 PROCEDURE — G8420 CALC BMI NORM PARAMETERS: HCPCS | Performed by: FAMILY MEDICINE

## 2018-01-26 PROCEDURE — G8484 FLU IMMUNIZE NO ADMIN: HCPCS | Performed by: FAMILY MEDICINE

## 2018-01-26 PROCEDURE — G8427 DOCREV CUR MEDS BY ELIG CLIN: HCPCS | Performed by: FAMILY MEDICINE

## 2018-01-26 PROCEDURE — G8926 SPIRO NO PERF OR DOC: HCPCS | Performed by: FAMILY MEDICINE

## 2018-01-26 PROCEDURE — 4004F PT TOBACCO SCREEN RCVD TLK: CPT | Performed by: FAMILY MEDICINE

## 2018-01-26 PROCEDURE — 3023F SPIROM DOC REV: CPT | Performed by: FAMILY MEDICINE

## 2018-01-26 PROCEDURE — 4040F PNEUMOC VAC/ADMIN/RCVD: CPT | Performed by: FAMILY MEDICINE

## 2018-01-26 PROCEDURE — 1123F ACP DISCUSS/DSCN MKR DOCD: CPT | Performed by: FAMILY MEDICINE

## 2018-01-26 PROCEDURE — G8399 PT W/DXA RESULTS DOCUMENT: HCPCS | Performed by: FAMILY MEDICINE

## 2018-01-26 PROCEDURE — 99214 OFFICE O/P EST MOD 30 MIN: CPT | Performed by: FAMILY MEDICINE

## 2018-01-26 NOTE — PROGRESS NOTES
Subjective:      Patient ID: Panfilo Galarza is a 81 y.o. female. HPI   Chief Complaint   Patient presents with    3 Month Follow-Up     12 week, OARRS and labs     FU of pain medication and also 6 month FU of HLP,  IFG, COPD, ASHD/PVD, h/o aneurysms found incidentally on CT scans. Also has adenocarcinoma of right lung s/p radition that caused long term pain. Says she feels [place]. First time outside her home since Oct was on 1/20/18. She went to get her hair cut. It is harder and harder for her to get out with her oxygen and fraility. She does enjoy when her family comes to visit with her. She is hopeful to get out a bit more but her physical status is limiting her. Her family has to do more and more to help her. The tramadol - APAP helps her tolerate the chronic right chest pain from radiation treatment. She does not have any significant adverse SE. Never exceeds 4 per day and with refill history, is averaging much less per day. Still smoking and not wanting or interested in quitting. Does not smoke in same room as the oxygen.       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    Underweight due to inadequate caloric intake    Thoracic aortic aneurysm (HCC)    Peripheral vascular disease (HCC)    Abdominal pain    Right lower lobe lung mass    History of kidney stones    Hydronephrosis of right kidney    Abdominal aortic aneurysm without rupture ([address]    Encounter for follow-up surveillance of lung cancer    COPD, severe ([address]    Chronic hypoxemic respiratory failure (HCC)    Dyspepsia    SOB (shortness of breath)    Centrilobular emphysema ([address]    Nonalcoholic steatohepatitis (REES)    Constipation    Use of opiates for therapeutic purposes         Outpatient Prescriptions Marked as Taking for the 1/26/18 encounter (Office Visit) with Sonal Ojeda MD   Medication Sig Dispense Refill    azelastine (ASTELIN) 0.1 % nasal spray 1 spray by Nasal route 2 times daily Use in each nostril as directed 1 Bottle 3    ADVAIR DISKUS 250-50 MCG/DOSE AEPB INHALE 1 PUFF BY MOUTH TWICE DAILY 1 Inhaler 5    traMADol-acetaminophen (ULTRACET) 37.5-325 MG per tablet TAKE 1-2 TABLETS BY MOUTH EVERY 4-6 HOURS. MAX OF 6 PER DAY. First fill date is 11/1/17 with 2 additional refills for a total of 90 days. 90 tablet 2    tiotropium (SPIRIVA HANDIHALER) 18 MCG inhalation capsule INHALE THE CONTENTS OF 1 CAPSULE VIA INHALATION DEVICE DAILY INTO THE LUNGS 30 capsule 6    montelukast (SINGULAIR) 10 MG tablet TAKE 1 TABLET BY MOUTH EVERY NIGHT AT BEDTIME 90 tablet 2    famotidine (PEPCID) 20 MG tablet TAKE 1 TABLET BY MOUTH DAILY 90 tablet 2    busPIRone (BUSPAR) 15 MG tablet TAKE 1/2 TABLET BY MOUTH EVERY MORNING AND 1 TABLET BY MOUTH EVERY EVENING 135 tablet 1    simvastatin (ZOCOR) 40 MG tablet TAKE 1 TABLET BY MOUTH DAILY 90 tablet 1    mometasone (NASONEX) 50 MCG/ACT nasal spray 2 sprays by Nasal route daily 1 Inhaler 3    mirtazapine (REMERON) 7.5 MG tablet TAKE 1 TABLET BY MOUTH EVERY NIGHT 90 tablet 0    senna-docusate (SENOKOT S) 8.6-50 MG per tablet Take 1-2 tablets every day due to constipation from pain medication.  OXYGEN Inhale into the lungs 3 L AT NIGHT AND PRN      aspirin EC 81 MG EC tablet Take 1 tablet by mouth daily. With a meal. 30 tablet 11    vitamin B-12 (CYANOCOBALAMIN) 1000 MCG tablet Take 1,000 mcg by mouth daily.  VITAMIN D-3 (COLECALCIFEROL) 400 UNITS TABS Take  by mouth daily.  Bimatoprost (LUMIGAN) 0.01 % SOLN Apply  to eye daily.  Daily in rt eye          Social History   Substance Use Topics    Smoking status: Current Every Day Smoker     Packs/day: 1.00     Years: 60.00     Types: Cigarettes    Smokeless tobacco: Never Used      Comment: stated she's cut down to 1/4-1/2 pack    Alcohol use No             Review of Systems   Constitutional: Positive for fatigue. Negative for chills, diaphoresis, fever and unexpected weight change. Respiratory: Positive for shortness of breath. Negative for wheezing. Cardiovascular: Positive for chest pain. Negative for palpitations and leg swelling. Gastrointestinal: Negative for constipation, diarrhea, nausea and vomiting. Endocrine: Negative for polyphagia. Musculoskeletal: Negative for gait problem and neck stiffness. Skin: Negative for rash. Neurological: Negative for dizziness, seizures and syncope. Psychiatric/Behavioral: Positive for dysphoric mood. Negative for behavioral problems, confusion and sleep disturbance. Objective:   Physical Exam   Constitutional: She is oriented to person, place, and time. She appears well-developed and well-nourished. No distress. Eyes: Conjunctivae are normal. No scleral icterus. Neck: Normal range of motion. Neck supple. Carotid bruit is not present. No thyroid mass and no thyromegaly present. Cardiovascular: Normal rate, regular rhythm, S1 normal, S2 normal, normal heart sounds and intact distal pulses. Exam reveals decreased pulses. No murmur heard. Pulmonary/Chest: Effort normal. No respiratory distress. She has decreased breath sounds. She has no wheezes. She has no rhonchi. She has no rales. Moist cough. No rales but significantly decreased BSs. Abdominal: Soft. Normal appearance and bowel sounds are normal. She exhibits no abdominal bruit and no mass. There is no hepatomegaly. There is no tenderness. Lymphadenopathy:     She has no cervical adenopathy. Neurological: She is alert and oriented to person, place, and time. She displays no tremor. She exhibits normal muscle tone.  Coordination and gait normal.   Skin: Skin is warm and dry. She is not diaphoretic. No cyanosis. No pallor. Nails show no clubbing. Psychiatric: She has a normal mood and affect. Her speech is normal and behavior is normal. Cognition and memory are normal.   Vitals reviewed. Wt Readings from Last 3 Encounters:   01/26/18 102 lb (46.3 kg)   11/01/17 102 lb (46.3 kg)   08/14/17 101 lb (45.8 kg)     Temp Readings from Last 3 Encounters:   08/14/17 98 °F (36.7 °C) (Oral)   06/26/17 97.7 °F (36.5 °C) (Oral)   08/15/16 98.5 °F (36.9 °C) (Oral)     BP Readings from Last 3 Encounters:   01/26/18 110/80   11/01/17 130/70   10/25/17 (!) 144/77     Pulse Readings from Last 3 Encounters:   01/26/18 92   11/01/17 92   10/25/17 103       Lab Results   Component Value Date    LDLCALC 47 01/09/2017    LDLDIRECT 56 07/06/2016         Assessment:      1. COPD, severe ([address]  On Advair and Spiriva. Declines any desire or intervetion. to change her smoking.   - CBC Auto Differential    2. Centrilobular emphysema ([address]  DC smoking advised but she is not ready. 3. Primary cancer of right middle lobe of lung ([address]  She is seeing Dr. Birdie Choudhary for routine FU. I reviewed her last CT scan from 8/10/2107    4. Mixed hyperlipidemia  At goal and meeting medical guidelines. Continue treatment. She is fasting today for yearly check. - Comprehensive Metabolic Panel  - Lipid Panel    5. 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 MAX OF 6 PER DAY. First fill date is 1/27/2018. Yuridia Howell Dispense: 90 tablet; Refill: 2    6. Use of opiates for therapeutic purposes  Controlled Substances Monitoring:     Attestation: The Prescription Monitoring Report for this patient was reviewed today. Denisha Oleary MD)  Documentation: No signs of potential drug abuse or diversion identified. , Possible medication side effects, risk of tolerance and/or dependence, and alternative treatments discussed., Obtaining appropriate analgesic effect of treatment. Timmy Vanegas MD)  Medication Contracts: Existing medication contract. Timmy Vanegas MD)      7. Chronic pain due to neoplasm  See #5 above    8. Peripheral vascular disease (HCC)  Nicotine abstinence advised. On statin. She has no symptoms. 9. Abdominal aortic aneurysm without rupture ([address]  Incidental finding. Stable. 10. Vitamin D deficiency  - Vitamin D 25 Hydroxy    11. Fatigue, unspecified type  - Comprehensive Metabolic Panel  - CBC Auto Differential  - TSH with Reflex    12. Chronic hypoxemic respiratory failure (HCC)  On chronic oxygen and seen by pulmonology. On LABA/ICS and anti-cholinergic inhalers. 15. Thoracic aortic aneurysm without rupture ([address]  Incidental finding. Control BP.      14. Underweight due to inadequate caloric intake  Weight is stable at this time. .     15. Tobacco use disorder  Advised total abstinence. Plan:      See assessment and/or orders. See after visit summary, patient instructions, and reference hand-outs. Discussed use, benefit, and side effects of prescribed medications. Barriers to medication compliance addressed. All patient questions answered.

## 2018-01-26 NOTE — PROGRESS NOTES
.  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    Underweight due to inadequate caloric intake    Thoracic aortic aneurysm (HCC)    Peripheral vascular disease (HCC)    Abdominal pain    Right lower lobe lung mass    History of kidney stones    Hydronephrosis of right kidney    Abdominal aortic aneurysm without rupture ([address]    Encounter for follow-up surveillance of lung cancer    COPD, severe ([address]    Chronic hypoxemic respiratory failure (HCC)    Dyspepsia    SOB (shortness of breath)    Centrilobular emphysema ([address]    Nonalcoholic steatohepatitis (REES)    Constipation    Use of opiates for therapeutic purposes         Outpatient Prescriptions Marked as Taking for the 1/26/18 encounter (Office Visit) with Leslie Kowalski MD   Medication Sig Dispense Refill    azelastine (ASTELIN) 0.1 % nasal spray 1 spray by Nasal route 2 times daily Use in each nostril as directed 1 Bottle 3    ADVAIR DISKUS 250-50 MCG/DOSE AEPB INHALE 1 PUFF BY MOUTH TWICE DAILY 1 Inhaler 5    traMADol-acetaminophen (ULTRACET) 37.5-325 MG per tablet TAKE 1-2 TABLETS BY MOUTH EVERY 4-6 HOURS. MAX OF 6 PER DAY. First fill date is 11/1/17 with 2 additional refills for a total of 90 days.  90 tablet 2    tiotropium (SPIRIVA HANDIHALER) 18 MCG inhalation capsule INHALE THE CONTENTS OF 1 CAPSULE VIA INHALATION DEVICE DAILY INTO THE LUNGS 30 capsule 6    montelukast (SINGULAIR) 10 MG tablet TAKE 1 TABLET BY MOUTH EVERY NIGHT AT BEDTIME 90 tablet 2    famotidine (PEPCID) 20 MG tablet

## 2018-01-28 PROBLEM — G89.3 CHRONIC PAIN DUE TO NEOPLASM: Status: ACTIVE | Noted: 2018-01-28

## 2018-01-28 ASSESSMENT — ENCOUNTER SYMPTOMS
SHORTNESS OF BREATH: 1
NAUSEA: 0
CONSTIPATION: 0
WHEEZING: 0
DIARRHEA: 0
VOMITING: 0

## 2018-03-03 DIAGNOSIS — F41.1 GAD (GENERALIZED ANXIETY DISORDER): ICD-10-CM

## 2018-03-05 RX ORDER — MIRTAZAPINE 7.5 MG/1
TABLET, FILM COATED ORAL
Qty: 90 TABLET | Refills: 0 | Status: SHIPPED | OUTPATIENT
Start: 2018-03-05 | End: 2018-04-20 | Stop reason: SDUPTHER

## 2018-04-01 DIAGNOSIS — F41.1 GAD (GENERALIZED ANXIETY DISORDER): ICD-10-CM

## 2018-04-02 DIAGNOSIS — E78.2 MIXED HYPERLIPIDEMIA: ICD-10-CM

## 2018-04-02 RX ORDER — BUSPIRONE HYDROCHLORIDE 15 MG/1
TABLET ORAL
Qty: 135 TABLET | Refills: 0 | Status: SHIPPED | OUTPATIENT
Start: 2018-04-02 | End: 2018-06-18 | Stop reason: SDUPTHER

## 2018-04-02 RX ORDER — SIMVASTATIN 40 MG
TABLET ORAL
Qty: 90 TABLET | Refills: 0 | Status: SHIPPED | OUTPATIENT
Start: 2018-04-02 | End: 2018-06-18 | Stop reason: SDUPTHER

## 2018-04-20 ENCOUNTER — OFFICE VISIT (OUTPATIENT)
Dept: INTERNAL MEDICINE CLINIC | Age: 83
End: 2018-04-20

## 2018-04-20 VITALS
SYSTOLIC BLOOD PRESSURE: 132 MMHG | WEIGHT: 100.8 LBS | HEIGHT: 62 IN | HEART RATE: 84 BPM | BODY MASS INDEX: 18.55 KG/M2 | DIASTOLIC BLOOD PRESSURE: 78 MMHG

## 2018-04-20 DIAGNOSIS — R25.1 TREMOR: ICD-10-CM

## 2018-04-20 DIAGNOSIS — F41.1 GAD (GENERALIZED ANXIETY DISORDER): ICD-10-CM

## 2018-04-20 DIAGNOSIS — G89.3 CHRONIC PAIN DUE TO NEOPLASM: ICD-10-CM

## 2018-04-20 DIAGNOSIS — Z79.891 USE OF OPIATES FOR THERAPEUTIC PURPOSES: ICD-10-CM

## 2018-04-20 DIAGNOSIS — J44.9 COPD, SEVERE (HCC): ICD-10-CM

## 2018-04-20 DIAGNOSIS — F33.9 EPISODE OF RECURRENT MAJOR DEPRESSIVE DISORDER, UNSPECIFIED DEPRESSION EPISODE SEVERITY (HCC): Primary | ICD-10-CM

## 2018-04-20 PROCEDURE — 4004F PT TOBACCO SCREEN RCVD TLK: CPT | Performed by: FAMILY MEDICINE

## 2018-04-20 PROCEDURE — 4040F PNEUMOC VAC/ADMIN/RCVD: CPT | Performed by: FAMILY MEDICINE

## 2018-04-20 PROCEDURE — G8926 SPIRO NO PERF OR DOC: HCPCS | Performed by: FAMILY MEDICINE

## 2018-04-20 PROCEDURE — G8427 DOCREV CUR MEDS BY ELIG CLIN: HCPCS | Performed by: FAMILY MEDICINE

## 2018-04-20 PROCEDURE — 99214 OFFICE O/P EST MOD 30 MIN: CPT | Performed by: FAMILY MEDICINE

## 2018-04-20 PROCEDURE — G8418 CALC BMI BLW LOW PARAM F/U: HCPCS | Performed by: FAMILY MEDICINE

## 2018-04-20 PROCEDURE — 1090F PRES/ABSN URINE INCON ASSESS: CPT | Performed by: FAMILY MEDICINE

## 2018-04-20 PROCEDURE — G8399 PT W/DXA RESULTS DOCUMENT: HCPCS | Performed by: FAMILY MEDICINE

## 2018-04-20 PROCEDURE — 3023F SPIROM DOC REV: CPT | Performed by: FAMILY MEDICINE

## 2018-04-20 PROCEDURE — 1123F ACP DISCUSS/DSCN MKR DOCD: CPT | Performed by: FAMILY MEDICINE

## 2018-04-20 RX ORDER — TOPIRAMATE 25 MG/1
12.5-25 TABLET ORAL DAILY
Qty: 30 TABLET | Refills: 3 | Status: SHIPPED | OUTPATIENT
Start: 2018-04-20 | End: 2018-05-24 | Stop reason: SINTOL

## 2018-04-20 RX ORDER — MIRTAZAPINE 15 MG/1
15 TABLET, FILM COATED ORAL NIGHTLY
Qty: 30 TABLET | Refills: 5 | Status: SHIPPED | OUTPATIENT
Start: 2018-04-20 | End: 2018-06-01 | Stop reason: SDUPTHER

## 2018-04-22 ASSESSMENT — ENCOUNTER SYMPTOMS
DIARRHEA: 0
NAUSEA: 0
SHORTNESS OF BREATH: 0
VOMITING: 0
CONSTIPATION: 0

## 2018-04-25 ENCOUNTER — OFFICE VISIT (OUTPATIENT)
Dept: PULMONOLOGY | Age: 83
End: 2018-04-25

## 2018-04-25 VITALS
BODY MASS INDEX: 18.47 KG/M2 | OXYGEN SATURATION: 99 % | WEIGHT: 101 LBS | SYSTOLIC BLOOD PRESSURE: 141 MMHG | DIASTOLIC BLOOD PRESSURE: 71 MMHG | HEART RATE: 84 BPM

## 2018-04-25 DIAGNOSIS — R06.02 SOB (SHORTNESS OF BREATH): Primary | ICD-10-CM

## 2018-04-25 DIAGNOSIS — J43.2 CENTRILOBULAR EMPHYSEMA (HCC): ICD-10-CM

## 2018-04-25 DIAGNOSIS — J44.9 COPD, SEVERE (HCC): ICD-10-CM

## 2018-04-25 DIAGNOSIS — C34.2 PRIMARY CANCER OF RIGHT MIDDLE LOBE OF LUNG (HCC): ICD-10-CM

## 2018-04-25 DIAGNOSIS — J96.11 CHRONIC HYPOXEMIC RESPIRATORY FAILURE (HCC): ICD-10-CM

## 2018-04-25 PROCEDURE — G8399 PT W/DXA RESULTS DOCUMENT: HCPCS | Performed by: INTERNAL MEDICINE

## 2018-04-25 PROCEDURE — 3023F SPIROM DOC REV: CPT | Performed by: INTERNAL MEDICINE

## 2018-04-25 PROCEDURE — 4004F PT TOBACCO SCREEN RCVD TLK: CPT | Performed by: INTERNAL MEDICINE

## 2018-04-25 PROCEDURE — 1090F PRES/ABSN URINE INCON ASSESS: CPT | Performed by: INTERNAL MEDICINE

## 2018-04-25 PROCEDURE — 4040F PNEUMOC VAC/ADMIN/RCVD: CPT | Performed by: INTERNAL MEDICINE

## 2018-04-25 PROCEDURE — G8926 SPIRO NO PERF OR DOC: HCPCS | Performed by: INTERNAL MEDICINE

## 2018-04-25 PROCEDURE — G8418 CALC BMI BLW LOW PARAM F/U: HCPCS | Performed by: INTERNAL MEDICINE

## 2018-04-25 PROCEDURE — G8427 DOCREV CUR MEDS BY ELIG CLIN: HCPCS | Performed by: INTERNAL MEDICINE

## 2018-04-25 PROCEDURE — 99214 OFFICE O/P EST MOD 30 MIN: CPT | Performed by: INTERNAL MEDICINE

## 2018-04-25 PROCEDURE — 1123F ACP DISCUSS/DSCN MKR DOCD: CPT | Performed by: INTERNAL MEDICINE

## 2018-05-16 PROBLEM — K92.2 GI BLEEDING: Status: ACTIVE | Noted: 2018-05-16

## 2018-05-17 PROBLEM — E44.1 MILD MALNUTRITION (HCC): Chronic | Status: ACTIVE | Noted: 2018-05-17

## 2018-05-22 ENCOUNTER — TELEPHONE (OUTPATIENT)
Dept: INTERNAL MEDICINE CLINIC | Age: 83
End: 2018-05-22

## 2018-05-23 ENCOUNTER — TELEPHONE (OUTPATIENT)
Dept: PULMONOLOGY | Age: 83
End: 2018-05-23

## 2018-05-24 ENCOUNTER — TELEPHONE (OUTPATIENT)
Dept: INTERNAL MEDICINE CLINIC | Age: 83
End: 2018-05-24

## 2018-05-24 ENCOUNTER — OFFICE VISIT (OUTPATIENT)
Dept: INTERNAL MEDICINE CLINIC | Age: 83
End: 2018-05-24

## 2018-05-24 VITALS
HEIGHT: 62 IN | WEIGHT: 94.2 LBS | HEART RATE: 120 BPM | OXYGEN SATURATION: 88 % | SYSTOLIC BLOOD PRESSURE: 106 MMHG | DIASTOLIC BLOOD PRESSURE: 68 MMHG | BODY MASS INDEX: 17.34 KG/M2

## 2018-05-24 DIAGNOSIS — R62.7 ADULT FAILURE TO THRIVE: ICD-10-CM

## 2018-05-24 DIAGNOSIS — R53.1 WEAKNESS: ICD-10-CM

## 2018-05-24 DIAGNOSIS — J44.1 COPD EXACERBATION (HCC): ICD-10-CM

## 2018-05-24 DIAGNOSIS — R25.1 TREMORS OF NERVOUS SYSTEM: Primary | ICD-10-CM

## 2018-05-24 DIAGNOSIS — K62.5 RECTAL BLEEDING: ICD-10-CM

## 2018-05-24 LAB
A/G RATIO: 1.4 (ref 1.1–2.2)
ALBUMIN SERPL-MCNC: 4 G/DL (ref 3.4–5)
ALP BLD-CCNC: 207 U/L (ref 40–129)
ALT SERPL-CCNC: 30 U/L (ref 10–40)
ANION GAP SERPL CALCULATED.3IONS-SCNC: 17 MMOL/L (ref 3–16)
AST SERPL-CCNC: 55 U/L (ref 15–37)
BASOPHILS ABSOLUTE: 0 K/UL (ref 0–0.2)
BASOPHILS RELATIVE PERCENT: 0.2 %
BILIRUB SERPL-MCNC: 0.6 MG/DL (ref 0–1)
BUN BLDV-MCNC: 8 MG/DL (ref 7–20)
CALCIUM SERPL-MCNC: 9.2 MG/DL (ref 8.3–10.6)
CHLORIDE BLD-SCNC: 93 MMOL/L (ref 99–110)
CO2: 28 MMOL/L (ref 21–32)
CREAT SERPL-MCNC: <0.5 MG/DL (ref 0.6–1.2)
EOSINOPHILS ABSOLUTE: 0 K/UL (ref 0–0.6)
EOSINOPHILS RELATIVE PERCENT: 0.2 %
GFR AFRICAN AMERICAN: >60
GFR NON-AFRICAN AMERICAN: >60
GLOBULIN: 2.9 G/DL
GLUCOSE BLD-MCNC: 110 MG/DL (ref 70–99)
HCT VFR BLD CALC: 39.7 % (ref 36–48)
HEMOGLOBIN: 13.6 G/DL (ref 12–16)
IRON SATURATION: 19 % (ref 15–50)
IRON: 38 UG/DL (ref 37–145)
LYMPHOCYTES ABSOLUTE: 1.4 K/UL (ref 1–5.1)
LYMPHOCYTES RELATIVE PERCENT: 10.2 %
MCH RBC QN AUTO: 29.7 PG (ref 26–34)
MCHC RBC AUTO-ENTMCNC: 34.3 G/DL (ref 31–36)
MCV RBC AUTO: 86.7 FL (ref 80–100)
MONOCYTES ABSOLUTE: 1.1 K/UL (ref 0–1.3)
MONOCYTES RELATIVE PERCENT: 7.7 %
NEUTROPHILS ABSOLUTE: 11.4 K/UL (ref 1.7–7.7)
NEUTROPHILS RELATIVE PERCENT: 81.7 %
PDW BLD-RTO: 14.1 % (ref 12.4–15.4)
PLATELET # BLD: 342 K/UL (ref 135–450)
PMV BLD AUTO: 8.7 FL (ref 5–10.5)
POTASSIUM SERPL-SCNC: 4.5 MMOL/L (ref 3.5–5.1)
RBC # BLD: 4.58 M/UL (ref 4–5.2)
SODIUM BLD-SCNC: 138 MMOL/L (ref 136–145)
TOTAL IRON BINDING CAPACITY: 202 UG/DL (ref 260–445)
TOTAL PROTEIN: 6.9 G/DL (ref 6.4–8.2)
TSH REFLEX: 2.43 UIU/ML (ref 0.27–4.2)
WBC # BLD: 14 K/UL (ref 4–11)

## 2018-05-24 PROCEDURE — 99214 OFFICE O/P EST MOD 30 MIN: CPT | Performed by: FAMILY MEDICINE

## 2018-05-24 PROCEDURE — 1111F DSCHRG MED/CURRENT MED MERGE: CPT | Performed by: FAMILY MEDICINE

## 2018-05-24 PROCEDURE — 4004F PT TOBACCO SCREEN RCVD TLK: CPT | Performed by: FAMILY MEDICINE

## 2018-05-24 PROCEDURE — 3023F SPIROM DOC REV: CPT | Performed by: FAMILY MEDICINE

## 2018-05-24 PROCEDURE — G8427 DOCREV CUR MEDS BY ELIG CLIN: HCPCS | Performed by: FAMILY MEDICINE

## 2018-05-24 PROCEDURE — 1090F PRES/ABSN URINE INCON ASSESS: CPT | Performed by: FAMILY MEDICINE

## 2018-05-24 PROCEDURE — 1123F ACP DISCUSS/DSCN MKR DOCD: CPT | Performed by: FAMILY MEDICINE

## 2018-05-24 PROCEDURE — G8399 PT W/DXA RESULTS DOCUMENT: HCPCS | Performed by: FAMILY MEDICINE

## 2018-05-24 PROCEDURE — 4040F PNEUMOC VAC/ADMIN/RCVD: CPT | Performed by: FAMILY MEDICINE

## 2018-05-24 PROCEDURE — G8926 SPIRO NO PERF OR DOC: HCPCS | Performed by: FAMILY MEDICINE

## 2018-05-24 PROCEDURE — G8418 CALC BMI BLW LOW PARAM F/U: HCPCS | Performed by: FAMILY MEDICINE

## 2018-05-24 RX ORDER — AZITHROMYCIN 250 MG/1
TABLET, FILM COATED ORAL
Qty: 1 PACKET | Refills: 1 | Status: SHIPPED | OUTPATIENT
Start: 2018-05-24 | End: 2018-05-29

## 2018-05-24 RX ORDER — GABAPENTIN 300 MG/1
300 CAPSULE ORAL 3 TIMES DAILY
Qty: 90 CAPSULE | Refills: 2 | Status: SHIPPED | OUTPATIENT
Start: 2018-05-24 | End: 2018-09-17 | Stop reason: SDUPTHER

## 2018-05-25 ENCOUNTER — TELEPHONE (OUTPATIENT)
Dept: INTERNAL MEDICINE CLINIC | Age: 83
End: 2018-05-25

## 2018-05-27 PROBLEM — R62.7 ADULT FAILURE TO THRIVE: Status: ACTIVE | Noted: 2018-05-27

## 2018-05-29 ENCOUNTER — TELEPHONE (OUTPATIENT)
Dept: INTERNAL MEDICINE CLINIC | Age: 83
End: 2018-05-29

## 2018-06-06 ENCOUNTER — TELEPHONE (OUTPATIENT)
Dept: INTERNAL MEDICINE CLINIC | Age: 83
End: 2018-06-06

## 2018-06-06 DIAGNOSIS — J44.9 COPD, SEVERE (HCC): Primary | ICD-10-CM

## 2018-06-06 DIAGNOSIS — R53.1 WEAKNESS GENERALIZED: ICD-10-CM

## 2018-06-06 DIAGNOSIS — C34.2 PRIMARY CANCER OF RIGHT MIDDLE LOBE OF LUNG (HCC): ICD-10-CM

## 2018-06-06 DIAGNOSIS — E44.1 MILD MALNUTRITION (HCC): Chronic | ICD-10-CM

## 2018-06-18 DIAGNOSIS — E78.2 MIXED HYPERLIPIDEMIA: ICD-10-CM

## 2018-06-18 DIAGNOSIS — F41.1 GAD (GENERALIZED ANXIETY DISORDER): ICD-10-CM

## 2018-06-18 RX ORDER — BUSPIRONE HYDROCHLORIDE 15 MG/1
TABLET ORAL
Qty: 135 TABLET | Refills: 0 | Status: SHIPPED | OUTPATIENT
Start: 2018-06-18 | End: 2018-09-17 | Stop reason: SDUPTHER

## 2018-06-18 RX ORDER — SIMVASTATIN 40 MG
TABLET ORAL
Qty: 90 TABLET | Refills: 0 | Status: SHIPPED | OUTPATIENT
Start: 2018-06-18 | End: 2018-09-14 | Stop reason: SDUPTHER

## 2018-07-13 ENCOUNTER — OFFICE VISIT (OUTPATIENT)
Dept: INTERNAL MEDICINE CLINIC | Age: 83
End: 2018-07-13

## 2018-07-13 VITALS
OXYGEN SATURATION: 91 % | HEART RATE: 102 BPM | SYSTOLIC BLOOD PRESSURE: 142 MMHG | WEIGHT: 97.8 LBS | DIASTOLIC BLOOD PRESSURE: 82 MMHG | BODY MASS INDEX: 17.89 KG/M2

## 2018-07-13 DIAGNOSIS — G89.3 CHRONIC PAIN DUE TO NEOPLASM: ICD-10-CM

## 2018-07-13 DIAGNOSIS — R62.7 ADULT FAILURE TO THRIVE: ICD-10-CM

## 2018-07-13 DIAGNOSIS — G25.0 BENIGN ESSENTIAL TREMOR: Chronic | ICD-10-CM

## 2018-07-13 DIAGNOSIS — Z51.5 HOSPICE CARE PATIENT: ICD-10-CM

## 2018-07-13 DIAGNOSIS — H57.12 EYE PAIN, LEFT: ICD-10-CM

## 2018-07-13 DIAGNOSIS — C34.2 PRIMARY CANCER OF RIGHT MIDDLE LOBE OF LUNG (HCC): ICD-10-CM

## 2018-07-13 DIAGNOSIS — J44.9 COPD, SEVERE (HCC): Primary | ICD-10-CM

## 2018-07-13 DIAGNOSIS — R42 VERTIGO: ICD-10-CM

## 2018-07-13 PROCEDURE — 1101F PT FALLS ASSESS-DOCD LE1/YR: CPT | Performed by: FAMILY MEDICINE

## 2018-07-13 PROCEDURE — 4040F PNEUMOC VAC/ADMIN/RCVD: CPT | Performed by: FAMILY MEDICINE

## 2018-07-13 PROCEDURE — 3023F SPIROM DOC REV: CPT | Performed by: FAMILY MEDICINE

## 2018-07-13 PROCEDURE — 1090F PRES/ABSN URINE INCON ASSESS: CPT | Performed by: FAMILY MEDICINE

## 2018-07-13 PROCEDURE — G8399 PT W/DXA RESULTS DOCUMENT: HCPCS | Performed by: FAMILY MEDICINE

## 2018-07-13 PROCEDURE — G8926 SPIRO NO PERF OR DOC: HCPCS | Performed by: FAMILY MEDICINE

## 2018-07-13 PROCEDURE — 1123F ACP DISCUSS/DSCN MKR DOCD: CPT | Performed by: FAMILY MEDICINE

## 2018-07-13 PROCEDURE — 99214 OFFICE O/P EST MOD 30 MIN: CPT | Performed by: FAMILY MEDICINE

## 2018-07-13 PROCEDURE — G8427 DOCREV CUR MEDS BY ELIG CLIN: HCPCS | Performed by: FAMILY MEDICINE

## 2018-07-13 PROCEDURE — G8419 CALC BMI OUT NRM PARAM NOF/U: HCPCS | Performed by: FAMILY MEDICINE

## 2018-07-13 PROCEDURE — 4004F PT TOBACCO SCREEN RCVD TLK: CPT | Performed by: FAMILY MEDICINE

## 2018-07-13 NOTE — PROGRESS NOTES
Subjective:      Patient ID: Gurvinder Cosby is a 81 y.o. female. HPI   Chief Complaint   Patient presents with    Check-Up     12 wk med check       She is here for scheduled pain medication refill. She is now on [facility] and is getting lorazepam and morphine from Hospice but is not using morphine regularly and taking her ultracet for her chest wall pain. She is doing some better with her tremor and is feeling better. The extra assistance from Hospice has been helpful. They provide her with plenty of supplemental oxygen. She is now getting very dizzy. She is losing her balance. Has not fallen. She has to hold onto the walls or furniture to walk around. Sometimes gets nauseated. She does not connect it with any medication. Not time related to this and no other connection to a cause. She feels off balance. Feels like her head fills up with something. Sometimes vertigo when she moves around. Would like a referral to new eye doctor. Left eye pain. Dr. Sara Ross retired and does not not like new MD in his office. She has a brother that moved back here to the area. He lived in [place] but his step daughter and her children are here, so she has more people around to help out. Review of Systems   Constitutional: Negative for unexpected weight change. Eyes: Positive for pain. Respiratory: Positive for shortness of breath. Gastrointestinal: Negative for constipation, diarrhea, nausea and vomiting. Endocrine: Negative for polyphagia. Musculoskeletal: Negative for gait problem and neck stiffness. Skin: Negative for rash. Neurological: Positive for dizziness and weakness. Negative for tremors, seizures, syncope and light-headedness. Psychiatric/Behavioral: Negative for behavioral problems and confusion. Objective:   Physical Exam   Constitutional: She is oriented to person, place, and time. She appears well-developed and well-nourished. No distress.    HENT:   Right Ear:

## 2018-07-14 ASSESSMENT — ENCOUNTER SYMPTOMS
CONSTIPATION: 0
VOMITING: 0
EYE PAIN: 1
SHORTNESS OF BREATH: 1
NAUSEA: 0
DIARRHEA: 0

## 2018-09-14 DIAGNOSIS — E78.2 MIXED HYPERLIPIDEMIA: ICD-10-CM

## 2018-09-17 DIAGNOSIS — R25.1 TREMORS OF NERVOUS SYSTEM: ICD-10-CM

## 2018-09-17 DIAGNOSIS — F41.1 GAD (GENERALIZED ANXIETY DISORDER): ICD-10-CM

## 2018-09-17 RX ORDER — SIMVASTATIN 40 MG
TABLET ORAL
Qty: 90 TABLET | Refills: 0 | Status: SHIPPED | OUTPATIENT
Start: 2018-09-17 | End: 2018-12-17 | Stop reason: SDUPTHER

## 2018-09-17 RX ORDER — GABAPENTIN 300 MG/1
CAPSULE ORAL
Qty: 90 CAPSULE | Refills: 1 | Status: SHIPPED | OUTPATIENT
Start: 2018-09-17 | End: 2018-10-12 | Stop reason: SDUPTHER

## 2018-09-17 RX ORDER — BUSPIRONE HYDROCHLORIDE 15 MG/1
TABLET ORAL
Qty: 135 TABLET | Refills: 0 | Status: SHIPPED | OUTPATIENT
Start: 2018-09-17 | End: 2019-01-10 | Stop reason: SDUPTHER

## 2018-09-24 ENCOUNTER — TELEPHONE (OUTPATIENT)
Dept: INTERNAL MEDICINE CLINIC | Age: 83
End: 2018-09-24

## 2018-09-24 NOTE — TELEPHONE ENCOUNTER
PA submitted for Tramadol-Acetaminophen 37.5-325MG tablets on [facility].   (Key: [id number] - [id number]     Pending review

## 2018-10-12 ENCOUNTER — TELEPHONE (OUTPATIENT)
Dept: INTERNAL MEDICINE CLINIC | Age: 83
End: 2018-10-12

## 2018-10-12 DIAGNOSIS — R25.1 TREMORS OF NERVOUS SYSTEM: ICD-10-CM

## 2018-10-12 RX ORDER — GABAPENTIN 300 MG/1
300 CAPSULE ORAL 4 TIMES DAILY
Qty: 120 CAPSULE | Refills: 0
Start: 2018-10-12 | End: 2019-01-10

## 2018-10-29 ENCOUNTER — OFFICE VISIT (OUTPATIENT)
Dept: PULMONOLOGY | Age: 83
End: 2018-10-29
Payer: MEDICARE

## 2018-10-29 VITALS
SYSTOLIC BLOOD PRESSURE: 164 MMHG | HEART RATE: 103 BPM | WEIGHT: 87 LBS | OXYGEN SATURATION: 94 % | BODY MASS INDEX: 15.91 KG/M2 | DIASTOLIC BLOOD PRESSURE: 84 MMHG

## 2018-10-29 DIAGNOSIS — J96.11 CHRONIC HYPOXEMIC RESPIRATORY FAILURE (HCC): ICD-10-CM

## 2018-10-29 DIAGNOSIS — R06.02 SOB (SHORTNESS OF BREATH): Primary | ICD-10-CM

## 2018-10-29 DIAGNOSIS — J44.9 COPD, SEVERE (HCC): ICD-10-CM

## 2018-10-29 DIAGNOSIS — C34.2 PRIMARY CANCER OF RIGHT MIDDLE LOBE OF LUNG (HCC): ICD-10-CM

## 2018-10-29 DIAGNOSIS — F17.200 TOBACCO USE DISORDER: Chronic | ICD-10-CM

## 2018-10-29 DIAGNOSIS — J43.2 CENTRILOBULAR EMPHYSEMA (HCC): ICD-10-CM

## 2018-10-29 DIAGNOSIS — Z23 NEED FOR INFLUENZA VACCINATION: ICD-10-CM

## 2018-10-29 PROCEDURE — G8419 CALC BMI OUT NRM PARAM NOF/U: HCPCS | Performed by: INTERNAL MEDICINE

## 2018-10-29 PROCEDURE — 4040F PNEUMOC VAC/ADMIN/RCVD: CPT | Performed by: INTERNAL MEDICINE

## 2018-10-29 PROCEDURE — G0008 ADMIN INFLUENZA VIRUS VAC: HCPCS | Performed by: INTERNAL MEDICINE

## 2018-10-29 PROCEDURE — 1101F PT FALLS ASSESS-DOCD LE1/YR: CPT | Performed by: INTERNAL MEDICINE

## 2018-10-29 PROCEDURE — 90662 IIV NO PRSV INCREASED AG IM: CPT | Performed by: INTERNAL MEDICINE

## 2018-10-29 PROCEDURE — G8399 PT W/DXA RESULTS DOCUMENT: HCPCS | Performed by: INTERNAL MEDICINE

## 2018-10-29 PROCEDURE — 1090F PRES/ABSN URINE INCON ASSESS: CPT | Performed by: INTERNAL MEDICINE

## 2018-10-29 PROCEDURE — 4004F PT TOBACCO SCREEN RCVD TLK: CPT | Performed by: INTERNAL MEDICINE

## 2018-10-29 PROCEDURE — G8926 SPIRO NO PERF OR DOC: HCPCS | Performed by: INTERNAL MEDICINE

## 2018-10-29 PROCEDURE — G8482 FLU IMMUNIZE ORDER/ADMIN: HCPCS | Performed by: INTERNAL MEDICINE

## 2018-10-29 PROCEDURE — 3023F SPIROM DOC REV: CPT | Performed by: INTERNAL MEDICINE

## 2018-10-29 PROCEDURE — G8427 DOCREV CUR MEDS BY ELIG CLIN: HCPCS | Performed by: INTERNAL MEDICINE

## 2018-10-29 PROCEDURE — 1123F ACP DISCUSS/DSCN MKR DOCD: CPT | Performed by: INTERNAL MEDICINE

## 2018-10-29 PROCEDURE — 99214 OFFICE O/P EST MOD 30 MIN: CPT | Performed by: INTERNAL MEDICINE

## 2018-10-29 NOTE — PROGRESS NOTES
[facility]  Progress Note  Tonja Valles MD       Crow Escobar   YOB: 1935    Date of Visit:  10/29/2018    Assessment/Plan:  1. SOB (shortness of breath)  Stable dyspnea  Good days and bad days    2. COPD, severe ([address]  PFT 4/16:  Spirometry showed normal FVC of 2.03 L, 87% predicted and decreased FEV1 of  0.86 L, 50% predicted. FEV1/FVC ratio was decreased at 43%. Positive  response to bronchodilators demonstrated on spirometry.      IMPRESSION: Moderate to severe obstructive defect on spirometry with  positive response to bronchodilators. In comparison to the test that was done  in May 2014 FVC has improved by 15% and FEV1 by 15% as well. Advair 250/50 b.i.d. Spiriva once daily  Albuterol as needed    3. Centrilobular emphysema ([address]  CT chest 8/16:  Impression   1. Stable geographic parenchymal opacities in the right middle and lower lobe   likely sequela of previous radiation therapy. 2. Advanced centrilobular emphysema. 3. Aneurysm of the descending thoracic aorta     stable    4. Chronic hypoxemic respiratory failure (HCC)  O2 sats are acceptable on supplemental O2. The patient benefits from the use of supplemental O2.      5. Primary cancer of right middle lobe of lung ([address]  Adenocarcinoma status post XRT    CT chest 8/17:  Impression   Re- demonstration of lateral right middle lobe mass and right lower lobe   mass.  Rib erosion is seen involving at least 3 ribs adjacent to the right   middle lobe mass. Maryann Rwandan findings are stable as are the enlarged mediastinal   lymph nodes.  Overall no new findings are seen. [place] Record has been no   significant change in size of the masslike areas. Now loosing weight. Having some pain in the left chest.  She is in hospice now. Therefore I cancelled the CT chest.    6. Tobacco use disorder  Continues to smoke  Discussed methods to assist her in her efforts at smoking cessation    7.

## 2018-11-28 PROBLEM — Z23 NEED FOR INFLUENZA VACCINATION: Status: RESOLVED | Noted: 2018-10-29 | Resolved: 2018-11-28

## 2018-12-17 ENCOUNTER — OFFICE VISIT (OUTPATIENT)
Dept: INTERNAL MEDICINE CLINIC | Age: 83
End: 2018-12-17
Payer: MEDICARE

## 2018-12-17 VITALS
DIASTOLIC BLOOD PRESSURE: 72 MMHG | HEIGHT: 62 IN | SYSTOLIC BLOOD PRESSURE: 140 MMHG | HEART RATE: 100 BPM | WEIGHT: 85 LBS | BODY MASS INDEX: 15.64 KG/M2

## 2018-12-17 DIAGNOSIS — Z51.5 HOSPICE CARE PATIENT: ICD-10-CM

## 2018-12-17 DIAGNOSIS — R39.198 DECREASED URINE STREAM: ICD-10-CM

## 2018-12-17 DIAGNOSIS — R62.7 ADULT FAILURE TO THRIVE: Primary | ICD-10-CM

## 2018-12-17 DIAGNOSIS — Z79.891 USE OF OPIATES FOR THERAPEUTIC PURPOSES: ICD-10-CM

## 2018-12-17 DIAGNOSIS — E44.1 MILD MALNUTRITION (HCC): Chronic | ICD-10-CM

## 2018-12-17 DIAGNOSIS — G89.3 CHRONIC PAIN DUE TO NEOPLASM: ICD-10-CM

## 2018-12-17 PROCEDURE — 1123F ACP DISCUSS/DSCN MKR DOCD: CPT | Performed by: FAMILY MEDICINE

## 2018-12-17 PROCEDURE — 4040F PNEUMOC VAC/ADMIN/RCVD: CPT | Performed by: FAMILY MEDICINE

## 2018-12-17 PROCEDURE — G8419 CALC BMI OUT NRM PARAM NOF/U: HCPCS | Performed by: FAMILY MEDICINE

## 2018-12-17 PROCEDURE — 1090F PRES/ABSN URINE INCON ASSESS: CPT | Performed by: FAMILY MEDICINE

## 2018-12-17 PROCEDURE — G8399 PT W/DXA RESULTS DOCUMENT: HCPCS | Performed by: FAMILY MEDICINE

## 2018-12-17 PROCEDURE — 99214 OFFICE O/P EST MOD 30 MIN: CPT | Performed by: FAMILY MEDICINE

## 2018-12-17 PROCEDURE — G8427 DOCREV CUR MEDS BY ELIG CLIN: HCPCS | Performed by: FAMILY MEDICINE

## 2018-12-17 PROCEDURE — 1101F PT FALLS ASSESS-DOCD LE1/YR: CPT | Performed by: FAMILY MEDICINE

## 2018-12-17 PROCEDURE — G8482 FLU IMMUNIZE ORDER/ADMIN: HCPCS | Performed by: FAMILY MEDICINE

## 2018-12-17 PROCEDURE — 4004F PT TOBACCO SCREEN RCVD TLK: CPT | Performed by: FAMILY MEDICINE

## 2018-12-17 RX ORDER — TAMSULOSIN HYDROCHLORIDE 0.4 MG/1
0.4 CAPSULE ORAL DAILY
Qty: 30 CAPSULE | Refills: 5 | Status: SHIPPED | OUTPATIENT
Start: 2018-12-17

## 2018-12-17 ASSESSMENT — ENCOUNTER SYMPTOMS
ABDOMINAL PAIN: 0
COUGH: 1
WHEEZING: 0
SHORTNESS OF BREATH: 1

## 2019-01-10 ENCOUNTER — TELEPHONE (OUTPATIENT)
Dept: INTERNAL MEDICINE CLINIC | Age: 84
End: 2019-01-10

## 2019-01-10 DIAGNOSIS — C34.2 PRIMARY CANCER OF RIGHT MIDDLE LOBE OF LUNG (HCC): ICD-10-CM

## 2019-01-10 DIAGNOSIS — Z51.5 PALLIATIVE CARE PATIENT: ICD-10-CM

## 2019-01-10 DIAGNOSIS — G89.3 CHRONIC PAIN DUE TO NEOPLASM: ICD-10-CM

## 2019-01-10 DIAGNOSIS — F41.1 GAD (GENERALIZED ANXIETY DISORDER): ICD-10-CM

## 2019-01-10 RX ORDER — BUSPIRONE HYDROCHLORIDE 15 MG/1
TABLET ORAL
Qty: 135 TABLET | Refills: 1 | Status: SHIPPED | OUTPATIENT
Start: 2019-01-10 | End: 2019-09-25 | Stop reason: SDUPTHER

## 2019-01-11 ENCOUNTER — TELEPHONE (OUTPATIENT)
Dept: INTERNAL MEDICINE CLINIC | Age: 84
End: 2019-01-11

## 2019-01-16 ENCOUNTER — TELEPHONE (OUTPATIENT)
Dept: INTERNAL MEDICINE CLINIC | Age: 84
End: 2019-01-16

## 2019-01-16 DIAGNOSIS — R25.1 TREMORS OF NERVOUS SYSTEM: ICD-10-CM

## 2019-01-16 RX ORDER — GABAPENTIN 300 MG/1
300 CAPSULE ORAL 2 TIMES DAILY
Qty: 90 CAPSULE | Refills: 2
Start: 2019-01-16 | End: 2019-04-17

## 2019-01-28 ENCOUNTER — TELEPHONE (OUTPATIENT)
Dept: INTERNAL MEDICINE CLINIC | Age: 84
End: 2019-01-28

## 2019-01-28 DIAGNOSIS — G89.29 CHRONIC PAIN NOT DUE TO MALIGNANCY: Primary | ICD-10-CM

## 2019-01-28 RX ORDER — TRAMADOL HYDROCHLORIDE 50 MG/1
50-100 TABLET ORAL EVERY 6 HOURS PRN
Qty: 180 TABLET | Refills: 0 | Status: SHIPPED | OUTPATIENT
Start: 2019-01-28 | End: 2019-02-27

## 2019-01-29 ENCOUNTER — TELEPHONE (OUTPATIENT)
Dept: INTERNAL MEDICINE CLINIC | Age: 84
End: 2019-01-29

## 2019-01-29 RX ORDER — LIDOCAINE 40 MG/G
CREAM TOPICAL
Qty: 30 G | Refills: 2 | Status: SHIPPED | OUTPATIENT
Start: 2019-01-29

## 2019-01-30 ENCOUNTER — TELEPHONE (OUTPATIENT)
Dept: INTERNAL MEDICINE CLINIC | Age: 84
End: 2019-01-30

## 2019-03-14 DIAGNOSIS — E78.2 MIXED HYPERLIPIDEMIA: ICD-10-CM

## 2019-03-15 RX ORDER — SIMVASTATIN 40 MG
TABLET ORAL
Qty: 90 TABLET | Refills: 0 | Status: SHIPPED | OUTPATIENT
Start: 2019-03-15

## 2019-04-07 ENCOUNTER — APPOINTMENT (OUTPATIENT)
Dept: GENERAL RADIOLOGY | Age: 84
End: 2019-04-07
Payer: MEDICARE

## 2019-04-07 ENCOUNTER — HOSPITAL ENCOUNTER (EMERGENCY)
Age: 84
Discharge: HOME | End: 2019-04-07
Payer: MEDICARE

## 2019-04-07 VITALS
HEIGHT: 62 IN | DIASTOLIC BLOOD PRESSURE: 80 MMHG | SYSTOLIC BLOOD PRESSURE: 155 MMHG | BODY MASS INDEX: 14.17 KG/M2 | HEART RATE: 103 BPM | OXYGEN SATURATION: 93 % | TEMPERATURE: 97.7 F | RESPIRATION RATE: 20 BRPM | WEIGHT: 77 LBS

## 2019-04-07 DIAGNOSIS — L03.116 CELLULITIS OF LEFT LOWER EXTREMITY: ICD-10-CM
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
## 2019-04-07 DIAGNOSIS — M25.561 ACUTE PAIN OF RIGHT KNEE: ICD-10-CM

## 2019-04-07 PROCEDURE — 73560 X-RAY EXAM OF KNEE 1 OR 2: CPT

## 2019-04-07 PROCEDURE — 99283 EMERGENCY DEPT VISIT LOW MDM: CPT

## 2019-04-07 PROCEDURE — 73630 X-RAY EXAM OF FOOT: CPT

## 2019-04-07 RX ORDER — CEPHALEXIN 500 MG/1
500 CAPSULE ORAL 4 TIMES DAILY
Qty: 28 CAPSULE | Refills: 0 | Status: SHIPPED | OUTPATIENT
Start: 2019-04-07 | End: 2019-04-14

## 2019-04-07 ASSESSMENT — PAIN SCALES - GENERAL: PAINLEVEL_OUTOF10: 8

## 2019-04-07 ASSESSMENT — PAIN DESCRIPTION - PAIN TYPE: TYPE: ACUTE PAIN

## 2019-04-07 ASSESSMENT — PAIN DESCRIPTION - LOCATION: LOCATION: KNEE

## 2019-04-08 ASSESSMENT — ENCOUNTER SYMPTOMS
SHORTNESS OF BREATH: 0
WHEEZING: 0
VOMITING: 0
NAUSEA: 0
BACK PAIN: 0
COLOR CHANGE: 0
DIARRHEA: 0
ABDOMINAL PAIN: 0

## 2019-04-08 NOTE — ED PROVIDER NOTES
[address] [facility]  eMERGENCY dEPARTMENT eNCOUnter        Pt Name: Ani Steele  MRN: [id number]  Burtongfcasey 1935  Date of evaluation: 4/7/2019  Provider: Jihan Paulino PA-C  PCP: Suad Daigle MD    This patient was not seen and evaluated by the attending physician No att. providers found. CHIEF COMPLAINT       Chief Complaint   Patient presents with    Fall     patient states she was in the kitchen and hanging up her oxygen when she got caught up and stumbled, slid down the fridge. pain to the right knee. patient states she heard a crack of her knee when she went down. HISTORY OF PRESENT ILLNESS   (Location/Symptom, Timing/Onset, Context/Setting, Quality, Duration, Modifying Factors, Severity)  Note limiting factors. Ani Steele is a 81 y.o. female patient presents the emergency department for evaluation of right knee pain. Patient was hanging up her oxygen when she got caught up in the tubing and stumbled sliding down to the floor against the fridge. She states she felt a pop in her right knee. She denies it being bent behind her. Patient now states she has swelling to the right knee and pain into her foot. Patient states she has previous fractures in her foot that frequently cause pain. Patient incidentally notes that she has redness and swelling to her left foot that was not injured today. Patient has been told by home health that this is likely cellulitis and needs antibiotics. Patient currently rates her pain in her right knee has an 8/10. She has not taken any medicine at this time. Patient denies hitting her head, loss of consciousness, anticoagulant use or any other injuries. Nursing Notes were all reviewed and agreed with or any disagreements were addressed  in the HPI. REVIEW OF SYSTEMS    (2-9 systems for level 4, 10 or more for level 5)     Review of Systems   Constitutional: Negative for fatigue and fever. HENT: Negative. Eyes: Negative for visual disturbance. Respiratory: Negative for shortness of breath and wheezing. Cardiovascular: Negative for chest pain and palpitations. Gastrointestinal: Negative for abdominal pain, diarrhea, nausea and vomiting. Genitourinary: Negative for dysuria. Musculoskeletal: Positive for arthralgias (right knee). Negative for back pain, gait problem, joint swelling, myalgias, neck pain and neck stiffness. Skin: Negative for color change, pallor, rash and wound. Neurological: Negative for dizziness, syncope, light-headedness and headaches. Positives and Pertinent negatives as per HPI. Except as noted abovein the ROS, all other systems were reviewed and negative.        PAST MEDICAL HISTORY     Past Medical History:   Diagnosis Date    Adenocarcinoma of lung ([address] 3/28/2014    Benign essential tremor     Cataracts     Closed fracture of calcaneus 11/10/2010    Closed left hip fracture ([address] 8/2008    Common bile duct (CBD) stricture 2001 - 2004    h/o stent: Dr. Hattie Ramesh; sphincterotomy    COPD (chronic obstructive pulmonary disease) ([address]     Cuboid fracture 1/29/2010    Right ankle    Depression     UMU (generalized anxiety disorder) 2/9/2015    Hydronephrosis     Impaired fasting glucose     Kidney stones     Kidney stones     Nonalcoholic steatohepatitis (REES) 1/10/2017    Osteoarthritis     Osteoporosis     Peripheral vascular disease ([address]     Right Distal Radius Fracture 8/16/2010    Right lower lobe pneumonia ([address] 10/22/2014    Sciatica          SURGICAL HISTORY     Past Surgical History:   Procedure Laterality Date    APPENDECTOMY  1965    CARDIOVASCULAR STRESS TEST  2/08    negative    CATARACT REMOVAL      both eyes    CHOLECYSTECTOMY  2001    Chance Klein    COLONOSCOPY  2003    COLONOSCOPY  12/16/15    LUMBAR LAMINECTOMY  6/09    LUNG BIOPSY Right     SHOULDER SURGERY      both    TOTAL HIP ARTHROPLASTY Left 8/2008 Physician Provided Reason for Exam: Injury Acuity: Acute Type of Exam: Initial; ORDERING SYSTEM PROVIDED HISTORY: fall TECHNOLOGIST PROVIDED HISTORY: Reason for exam:->fall Ordering Physician Provided Reason for Exam: Injury Acuity: Acute Type of Exam: Initial FINDINGS: Right foot: No acute fracture or dislocation is identified. Left foot: No acute fracture or dislocation is evident     No fracture detected. PROCEDURES   Unless otherwise noted below, none     Procedures    CRITICAL CARE TIME   N/A    CONSULTS:  None      EMERGENCY DEPARTMENT COURSE and DIFFERENTIALDIAGNOSIS/MDM:   Vitals:    Vitals:    04/07/19 1709 04/07/19 1711   BP: (!) 155/80    Pulse: 107 103   Resp: 20    Temp: 97.7 °F (36.5 °C)    TempSrc: Infrared    SpO2: 93%    Weight: 77 lb (34.9 kg)    Height: 5' 2\" (1.575 m)        Patient was given thefollowing medications:  Medications - No data to display    Patient presents the emergency department for evaluation of injury after fall at home. Patient mechanical fall in which she injured her right knee. Evaluation patient has swelling and developing bruising to the right. Patient has pain with extremes of flexion. Patient has no laxity in the knee. Patient has a normal-looking right foot with normal strength with plantar flexion and dorsiflexion bilaterally. Patient has normal sensations bilaterally. Capillary refill is less than 2 seconds. Left foot is slightly swollen, erythematous and warm. Patient has a healing wound to her lateral malleolus that is the likely source of infection. X-ray shows no evidence of osteomyelitis. X-ray shows no bony fracture to the right foot. X-ray shows no acute bony abnormality to the right knee. Patient was told she likely has a contusion to the right knee that will resolve in time. Patient will be given Keflex for her left foot cellulitis. She was told to follow up with her primary care doctor for resolution of this infection.  Patient is amenable to this and will be discharged home. At this time I feel the patient is at low risk for acute bony fracture, osteomyelitis, neurovascular injury or other acute processes that require further management. FINAL IMPRESSION      1. Fall from slip, trip, or stumble, initial encounter    2. Cellulitis of left lower extremity    3.  Acute pain of right knee          DISPOSITION/PLAN   DISPOSITION Decision To Discharge 04/07/2019 06:11:36 PM      PATIENT REFERREDTO:  [facility] Emergency Department  [address] [facility]  [address] [address]  [phone]    If symptoms worsen    Mahnaz Mitchell MD  [address] [place] [address]  [phone]    Schedule an appointment as soon as possible for a visit in 3 days  As needed, for re-evaluation      DISCHARGE MEDICATIONS:  Discharge Medication List as of 4/7/2019  6:16 PM      START taking these medications    Details   cephALEXin (KEFLEX) 500 MG capsule Take 1 capsule by mouth 4 times daily for 7 days, Disp-28 capsule, R-0Print             DISCONTINUED MEDICATIONS:  Discharge Medication List as of 4/7/2019  6:16 PM                 (Please note that portions ofthis note were completed with a voice recognition program.  Efforts were made to edit the dictations but occasionally words are mis-transcribed.)    Polo Morris PA-C (electronically signed)            Polo Morris PA-C  04/08/19 0132

## 2019-04-10 ENCOUNTER — TELEPHONE (OUTPATIENT)
Dept: INTERNAL MEDICINE CLINIC | Age: 84
End: 2019-04-10

## 2019-04-10 NOTE — TELEPHONE ENCOUNTER
Cherri Vanegas from [facility] [employer] [employer] called. Patient was transferred from home to inpatient unit. Need to know if Dr. Eros Recinos wants to continue keflex for cellulitis 500 mg qid. This was d/c last night by doctor on call. Please advise.

## 2019-04-12 ENCOUNTER — TELEPHONE (OUTPATIENT)
Dept: INTERNAL MEDICINE CLINIC | Age: 84
End: 2019-04-12

## 2019-04-12 RX ORDER — DOXYCYCLINE 100 MG/1
100 CAPSULE ORAL 2 TIMES DAILY
Qty: 14 CAPSULE | Refills: 0 | Status: SHIPPED | OUTPATIENT
Start: 2019-04-12 | End: 2019-04-19

## 2019-04-12 NOTE — TELEPHONE ENCOUNTER
Called and spoke to Yury Luna. She will change to doxycycline if they feels its necessary. At this time it does not seem like cellulitis.

## 2019-04-17 ENCOUNTER — OFFICE VISIT (OUTPATIENT)
Dept: INTERNAL MEDICINE CLINIC | Age: 84
End: 2019-04-17
Payer: MEDICARE

## 2019-04-17 VITALS
BODY MASS INDEX: 14.08 KG/M2 | DIASTOLIC BLOOD PRESSURE: 88 MMHG | HEART RATE: 96 BPM | HEIGHT: 62 IN | SYSTOLIC BLOOD PRESSURE: 136 MMHG

## 2019-04-17 DIAGNOSIS — J96.11 CHRONIC HYPOXEMIC RESPIRATORY FAILURE (HCC): ICD-10-CM

## 2019-04-17 DIAGNOSIS — R62.7 ADULT FAILURE TO THRIVE: ICD-10-CM

## 2019-04-17 DIAGNOSIS — C34.2 PRIMARY CANCER OF RIGHT MIDDLE LOBE OF LUNG (HCC): ICD-10-CM
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
## 2019-04-17 DIAGNOSIS — M70.51 BURSITIS OF RIGHT PATELLA: Primary | ICD-10-CM

## 2019-04-17 PROCEDURE — G8399 PT W/DXA RESULTS DOCUMENT: HCPCS | Performed by: FAMILY MEDICINE

## 2019-04-17 PROCEDURE — G8419 CALC BMI OUT NRM PARAM NOF/U: HCPCS | Performed by: FAMILY MEDICINE

## 2019-04-17 PROCEDURE — 1090F PRES/ABSN URINE INCON ASSESS: CPT | Performed by: FAMILY MEDICINE

## 2019-04-17 PROCEDURE — 4040F PNEUMOC VAC/ADMIN/RCVD: CPT | Performed by: FAMILY MEDICINE

## 2019-04-17 PROCEDURE — G8427 DOCREV CUR MEDS BY ELIG CLIN: HCPCS | Performed by: FAMILY MEDICINE

## 2019-04-17 PROCEDURE — 1123F ACP DISCUSS/DSCN MKR DOCD: CPT | Performed by: FAMILY MEDICINE

## 2019-04-17 PROCEDURE — 4004F PT TOBACCO SCREEN RCVD TLK: CPT | Performed by: FAMILY MEDICINE

## 2019-04-17 PROCEDURE — 99214 OFFICE O/P EST MOD 30 MIN: CPT | Performed by: FAMILY MEDICINE

## 2019-04-17 RX ORDER — NAPROXEN 375 MG/1
375 TABLET ORAL 2 TIMES DAILY WITH MEALS
Qty: 30 TABLET | Refills: 0 | Status: SHIPPED | OUTPATIENT
Start: 2019-04-17 | End: 2019-05-02

## 2019-04-17 ASSESSMENT — PATIENT HEALTH QUESTIONNAIRE - PHQ9
2. FEELING DOWN, DEPRESSED OR HOPELESS: 0
SUM OF ALL RESPONSES TO PHQ QUESTIONS 1-9: 0
SUM OF ALL RESPONSES TO PHQ QUESTIONS 1-9: 0
SUM OF ALL RESPONSES TO PHQ9 QUESTIONS 1 & 2: 0
1. LITTLE INTEREST OR PLEASURE IN DOING THINGS: 0

## 2019-04-17 NOTE — PROGRESS NOTES
Subjective:      Patient ID: Yves Narvaez is a 81 y.o. female. HPI   Chief Complaint   Patient presents with    Check-Up     4 month check up. Here with 2 nieces. Here with kylie Ha and Clejillian Maravilla. She tripped over her oxygen cord when trying to let her dog out  [place] backward and slid down in her kitchen and twisted or tweaked her knee. Was not able to walk for a little while. Spent 1 week respite at  hospice   She is doing OK except the Right knee is hot and swollen. No fracture per ER x-ray. She is trying to eat but not able to keep her weight up. She continues to low and dwindle. Getting a little weaker. Does not feel too short of breath on oxygen. Her pain is relieved. Gabapentin helps her tremors and shaking.      Patient Active Problem List   Diagnosis    Mixed hyperlipidemia    Osteoarthritis    Osteoporosis    Chronic rhinitis    Benign essential tremor    Impaired fasting glucose    Primary cancer of right middle lobe of lung ([address]    Tobacco use disorder    Constipation due to opioid therapy    Dysphagia    UMU (generalized anxiety disorder)    Underweight due to inadequate caloric intake    Thoracic aortic aneurysm (HCC)    Peripheral vascular disease ([address]    Right lower lobe lung mass    History of kidney stones    Hydronephrosis of right kidney    Abdominal aortic aneurysm without rupture (HCC)    COPD, severe (HCC)    Chronic hypoxemic respiratory failure (HCC)    Dyspepsia    SOB (shortness of breath)    Centrilobular emphysema (HCC)    Nonalcoholic steatohepatitis (REES)    Constipation    Use of opiates for therapeutic purposes    Chronic pain due to neoplasm    GI bleeding    Mild malnutrition ([address]    Adult failure to thrive    Hospice care patient    Palliative care patient - outlived 6 month Hospice time         Outpatient Medications Marked as Taking for the 4/17/19 encounter (Office Visit) with Candy Garcia MD   Medication Chronic hypoxemic respiratory failure (HCC)  Continue chronic oxygen. On Hospice care for this and FTT #5.     4. Primary cancer of right middle lobe of lung ([address]  Adenocarcinoma. Very slow growing and had XRT -- this is not likely the cause of her decline. 5. Adult failure to thrive  Anther 10 lb loss. Hospice is continuing care despite on Hospice > 6 months for this reason. If she gets ill, doubtful she will survive. She feels she continues to benefit from Hospice care. Plan:      See orders. See after visit summary, patient instructions, and reference hand-outs. A PRINTED SUMMARY = AVS GIVEN TO THE PATIENT. Discussed use, benefit, and side effects of prescribed medications. Barriers to medication compliance addressed. All patient questions answered.           Suri Horne MD

## 2019-04-18 ENCOUNTER — TELEPHONE (OUTPATIENT)
Dept: INTERNAL MEDICINE CLINIC | Age: 84
End: 2019-04-18

## 2019-04-20 ASSESSMENT — ENCOUNTER SYMPTOMS
SHORTNESS OF BREATH: 1
ABDOMINAL PAIN: 0
WHEEZING: 0
COUGH: 0
CHEST TIGHTNESS: 0

## 2019-05-01 ENCOUNTER — OFFICE VISIT (OUTPATIENT)
Dept: PULMONOLOGY | Age: 84
End: 2019-05-01
Payer: MEDICARE

## 2019-05-01 VITALS
SYSTOLIC BLOOD PRESSURE: 157 MMHG | WEIGHT: 87 LBS | HEART RATE: 104 BPM | OXYGEN SATURATION: 99 % | BODY MASS INDEX: 15.91 KG/M2 | DIASTOLIC BLOOD PRESSURE: 79 MMHG

## 2019-05-01 DIAGNOSIS — J96.11 CHRONIC HYPOXEMIC RESPIRATORY FAILURE (HCC): ICD-10-CM

## 2019-05-01 DIAGNOSIS — C34.2 PRIMARY CANCER OF RIGHT MIDDLE LOBE OF LUNG (HCC): ICD-10-CM

## 2019-05-01 DIAGNOSIS — J44.9 COPD, SEVERE (HCC): ICD-10-CM

## 2019-05-01 DIAGNOSIS — R06.02 SOB (SHORTNESS OF BREATH): Primary | ICD-10-CM

## 2019-05-01 DIAGNOSIS — F17.200 TOBACCO USE DISORDER: Chronic | ICD-10-CM

## 2019-05-01 DIAGNOSIS — J43.2 CENTRILOBULAR EMPHYSEMA (HCC): ICD-10-CM

## 2019-05-01 PROCEDURE — 3023F SPIROM DOC REV: CPT | Performed by: INTERNAL MEDICINE

## 2019-05-01 PROCEDURE — G8427 DOCREV CUR MEDS BY ELIG CLIN: HCPCS | Performed by: INTERNAL MEDICINE

## 2019-05-01 PROCEDURE — 4004F PT TOBACCO SCREEN RCVD TLK: CPT | Performed by: INTERNAL MEDICINE

## 2019-05-01 PROCEDURE — 1090F PRES/ABSN URINE INCON ASSESS: CPT | Performed by: INTERNAL MEDICINE

## 2019-05-01 PROCEDURE — G8926 SPIRO NO PERF OR DOC: HCPCS | Performed by: INTERNAL MEDICINE

## 2019-05-01 PROCEDURE — G8399 PT W/DXA RESULTS DOCUMENT: HCPCS | Performed by: INTERNAL MEDICINE

## 2019-05-01 PROCEDURE — G8419 CALC BMI OUT NRM PARAM NOF/U: HCPCS | Performed by: INTERNAL MEDICINE

## 2019-05-01 PROCEDURE — 99214 OFFICE O/P EST MOD 30 MIN: CPT | Performed by: INTERNAL MEDICINE

## 2019-05-01 PROCEDURE — 1123F ACP DISCUSS/DSCN MKR DOCD: CPT | Performed by: INTERNAL MEDICINE

## 2019-05-01 PROCEDURE — 4040F PNEUMOC VAC/ADMIN/RCVD: CPT | Performed by: INTERNAL MEDICINE

## 2019-05-01 NOTE — PROGRESS NOTES
[facility]  Progress Note  Ashlee Randall MD       Maryellen Yohana   YOB: 1935    Date of Visit:  5/1/2019    Assessment/Plan:  1. SOB (shortness of breath)  Stable dyspnea  Good days and bad days  She has gained weight. She now has family helping her at home. 2. COPD, severe ([address]  PFT 4/16:  Spirometry showed normal FVC of 2.03 L, 87% predicted and decreased FEV1 of  0.86 L, 50% predicted. FEV1/FVC ratio was decreased at 43%. Positive  response to bronchodilators demonstrated on spirometry.      IMPRESSION: Moderate to severe obstructive defect on spirometry with  positive response to bronchodilators. In comparison to the test that was done  in May 2014 FVC has improved by 15% and FEV1 by 15% as well. Advair 250/50 b.i.d. Spiriva once daily  Albuterol as needed    3. Centrilobular emphysema ([address]  CT chest 8/16:  Impression   1. Stable geographic parenchymal opacities in the right middle and lower lobe   likely sequela of previous radiation therapy. 2. Advanced centrilobular emphysema. 3. Aneurysm of the descending thoracic aorta       4. Chronic hypoxemic respiratory failure (HCC)  O2 sats are acceptable on supplemental O2. The patient benefits from the use of supplemental O2.      5. Primary cancer of right middle lobe of lung ([address]  Adenocarcinoma status post XRT    CT chest 8/17:  Impression   Re- demonstration of lateral right middle lobe mass and right lower lobe   mass.  Rib erosion is seen involving at least 3 ribs adjacent to the right   middle lobe mass. Jodelle Knuckles findings are stable as are the enlarged mediastinal   lymph nodes.  Overall no new findings are seen. Luana Falcon has been no   significant change in size of the masslike areas. Now loosing weight. Having some pain in the left chest.  She is in hospice now.  Therefore I cancelled the CT chest.    6. Tobacco use disorder  Continues to smoke  Discussed methods to assist her in her

## 2019-05-05 DIAGNOSIS — R25.1 TREMORS OF NERVOUS SYSTEM: ICD-10-CM

## 2019-05-06 RX ORDER — GABAPENTIN 300 MG/1
CAPSULE ORAL
Qty: 90 CAPSULE | Refills: 0 | Status: SHIPPED | OUTPATIENT
Start: 2019-05-06 | End: 2019-07-09 | Stop reason: SDUPTHER

## 2019-06-03 NOTE — TELEPHONE ENCOUNTER
Ted puckett/[facility] calling to suggest pt be transferred to the inpatient unit. She has had a really bad cough over the weekend and now her ribs are very sore. Ted Bazzi believes she would be better managed thru inpatient care. She also wants to know if Dr Wade Rodríguez will follow her care there or should the house phys manage her.

## 2019-06-21 ENCOUNTER — TELEPHONE (OUTPATIENT)
Dept: INTERNAL MEDICINE CLINIC | Age: 84
End: 2019-06-21

## 2019-06-21 NOTE — TELEPHONE ENCOUNTER
Maria Dolores Ling with [facility]-pt is in respitory  distress with frequent cough again  They have made arrangement for her to transefer back to inpatient hospice. They need an order from Dr Sixto Suero for transfer,have them manage symptoms and the hospice doctor to follow her there.

## 2019-06-24 ENCOUNTER — TELEPHONE (OUTPATIENT)
Dept: INTERNAL MEDICINE CLINIC | Age: 84
End: 2019-06-24

## 2019-06-24 RX ORDER — IPRATROPIUM BROMIDE AND ALBUTEROL SULFATE 2.5; .5 MG/3ML; MG/3ML
SOLUTION RESPIRATORY (INHALATION)
Qty: 360 ML
Start: 2019-06-24

## 2019-06-24 NOTE — TELEPHONE ENCOUNTER
Wendy Vail from [facility] calling and wants to go over pt's medications  Phone#    [phone]

## 2019-07-02 ENCOUNTER — TELEPHONE (OUTPATIENT)
Dept: INTERNAL MEDICINE CLINIC | Age: 84
End: 2019-07-02

## 2019-07-09 ENCOUNTER — HOSPITAL ENCOUNTER (EMERGENCY)
Age: 84
Discharge: HOME | End: 2019-07-10
Payer: MEDICARE

## 2019-07-09 DIAGNOSIS — T30.0 BURN: Primary | ICD-10-CM

## 2019-07-09 PROCEDURE — 2500000003 HC RX 250 WO HCPCS: Performed by: PHYSICIAN ASSISTANT

## 2019-07-09 PROCEDURE — 6370000000 HC RX 637 (ALT 250 FOR IP): Performed by: PHYSICIAN ASSISTANT

## 2019-07-09 PROCEDURE — 99284 EMERGENCY DEPT VISIT MOD MDM: CPT

## 2019-07-09 RX ORDER — HYDROCODONE BITARTRATE AND ACETAMINOPHEN 5; 325 MG/1; MG/1
1 TABLET ORAL EVERY 6 HOURS PRN
Qty: 10 TABLET | Refills: 0 | Status: SHIPPED | OUTPATIENT
Start: 2019-07-09 | End: 2019-07-12

## 2019-07-09 RX ORDER — CEPHALEXIN 250 MG/1
250 CAPSULE ORAL ONCE
Status: COMPLETED | OUTPATIENT
Start: 2019-07-09 | End: 2019-07-09

## 2019-07-09 RX ORDER — CEPHALEXIN 250 MG/1
250 CAPSULE ORAL 4 TIMES DAILY
Qty: 20 CAPSULE | Refills: 0 | Status: SHIPPED | OUTPATIENT
Start: 2019-07-09 | End: 2019-07-14

## 2019-07-09 RX ORDER — HYDROCODONE BITARTRATE AND ACETAMINOPHEN 5; 325 MG/1; MG/1
1 TABLET ORAL ONCE
Status: COMPLETED | OUTPATIENT
Start: 2019-07-09 | End: 2019-07-09

## 2019-07-09 RX ADMIN — CEPHALEXIN 250 MG: 250 CAPSULE ORAL at 23:39

## 2019-07-09 RX ADMIN — SILVER SULFADIAZINE: 10 CREAM TOPICAL at 23:52

## 2019-07-09 RX ADMIN — HYDROCODONE BITARTRATE AND ACETAMINOPHEN 1 TABLET: 5; 325 TABLET ORAL at 23:39

## 2019-07-09 ASSESSMENT — ENCOUNTER SYMPTOMS
RHINORRHEA: 0
COUGH: 0
NAUSEA: 0
WHEEZING: 0
DIARRHEA: 0
SHORTNESS OF BREATH: 0
ABDOMINAL PAIN: 0
VOMITING: 0

## 2019-07-09 ASSESSMENT — PAIN SCALES - GENERAL: PAINLEVEL_OUTOF10: 6

## 2019-07-09 ASSESSMENT — PAIN DESCRIPTION - PAIN TYPE: TYPE: ACUTE PAIN

## 2019-07-09 ASSESSMENT — PAIN DESCRIPTION - ORIENTATION: ORIENTATION: ANTERIOR;LEFT

## 2019-07-09 ASSESSMENT — PAIN DESCRIPTION - LOCATION: LOCATION: LEG;ARM

## 2019-07-10 ENCOUNTER — TELEPHONE (OUTPATIENT)
Dept: INTERNAL MEDICINE CLINIC | Age: 84
End: 2019-07-10

## 2019-07-10 VITALS
TEMPERATURE: 98.4 F | OXYGEN SATURATION: 95 % | RESPIRATION RATE: 16 BRPM | BODY MASS INDEX: 16.01 KG/M2 | HEART RATE: 95 BPM | SYSTOLIC BLOOD PRESSURE: 147 MMHG | WEIGHT: 87 LBS | HEIGHT: 62 IN | DIASTOLIC BLOOD PRESSURE: 74 MMHG

## 2019-07-10 RX ORDER — NICOTINE 21 MG/24HR
1 PATCH, TRANSDERMAL 24 HOURS TRANSDERMAL DAILY
Qty: 42 PATCH | Refills: 0 | Status: SHIPPED | OUTPATIENT
Start: 2019-07-10 | End: 2019-08-14 | Stop reason: DRUGHIGH

## 2019-07-10 NOTE — ED PROVIDER NOTES
this.  Also provided with wound care contact information for reevaluation more definitive treatment. Counseled on smoking cessation and risks associated with smoking and oxygen dependency. Patient's burns are not full thickness or circumferential burns. There is normal neurovascular status and no signs of circulatory compromise. I believe she be safely discharged home at this time but conditions for return to the ED were discussed such as any new or worsening symptoms or signs of neurovascular compromise, intractable pain or infection. She is agreeable to this plan and stable for discharge at this time. FINAL IMPRESSION      1. Burn          DISPOSITION/PLAN   DISPOSITION Decision To Discharge 07/09/2019 11:42:58 PM      PATIENT REFERREDTO:  Roland Negrete MD  [address] [place] [address] [address] [facility]  [phone]    Schedule an appointment as soon as possible for a visit       [facility] Emergency Department  [address] [place] [address]  [phone]  Go to   If symptoms worsen      DISCHARGE MEDICATIONS:  New Prescriptions    CEPHALEXIN (KEFLEX) 250 MG CAPSULE    Take 1 capsule by mouth 4 times daily for 5 days    HYDROCODONE-ACETAMINOPHEN (NORCO) 5-325 MG PER TABLET    Take 1 tablet by mouth every 6 hours as needed for Pain for up to 3 days.        DISCONTINUED MEDICATIONS:  Discontinued Medications    No medications on file              (Please note that portions ofthis note were completed with a voice recognition program.  Efforts were made to edit the dictations but occasionally words are mis-transcribed.)    Kris Vaughn PA-C (electronically signed)            Orville Seay, [place]  07/09/19 [id number]

## 2019-07-12 ENCOUNTER — TELEPHONE (OUTPATIENT)
Dept: INTERNAL MEDICINE CLINIC | Age: 84
End: 2019-07-12

## 2019-07-12 NOTE — TELEPHONE ENCOUNTER
Megha Coleman w/[facility] calling to request an order for pt to return to Hospice inpatient facility and will be managed by hospice phys.  Megha Coleman said she is almost certain this placement will be permanent

## 2019-07-24 ENCOUNTER — TELEPHONE (OUTPATIENT)
Dept: INTERNAL MEDICINE CLINIC | Age: 84
End: 2019-07-24

## 2019-07-24 DIAGNOSIS — R10.13 DYSPEPSIA: ICD-10-CM

## 2019-07-24 RX ORDER — FAMOTIDINE 20 MG/1
TABLET, FILM COATED ORAL
Qty: 90 TABLET | Refills: 0 | Status: SHIPPED | OUTPATIENT
Start: 2019-07-24

## 2019-07-24 NOTE — TELEPHONE ENCOUNTER
Called and spoke to Aroldo, gave ok for nasal spray per patients med list.   She is asking if Curly Mccabe can discontinue simvastatin.

## 2019-08-14 ENCOUNTER — TELEPHONE (OUTPATIENT)
Dept: INTERNAL MEDICINE CLINIC | Age: 84
End: 2019-08-14

## 2019-08-14 RX ORDER — NICOTINE 21 MG/24HR
PATCH, TRANSDERMAL 24 HOURS TRANSDERMAL
Qty: 14 PATCH | Refills: 1 | Status: SHIPPED | OUTPATIENT
Start: 2019-08-14 | End: 2019-09-11 | Stop reason: DRUGHIGH

## 2019-08-19 ENCOUNTER — TELEPHONE (OUTPATIENT)
Dept: INTERNAL MEDICINE CLINIC | Age: 84
End: 2019-08-19

## 2019-08-26 ENCOUNTER — TELEPHONE (OUTPATIENT)
Dept: INTERNAL MEDICINE CLINIC | Age: 84
End: 2019-08-26

## 2019-08-28 ENCOUNTER — TELEPHONE (OUTPATIENT)
Dept: INTERNAL MEDICINE CLINIC | Age: 84
End: 2019-08-28

## 2019-09-11 ENCOUNTER — TELEPHONE (OUTPATIENT)
Dept: INTERNAL MEDICINE CLINIC | Age: 84
End: 2019-09-11

## 2019-09-11 DIAGNOSIS — Z51.5 HOSPICE CARE PATIENT: ICD-10-CM

## 2019-09-11 DIAGNOSIS — R07.89 CHEST WALL PAIN: ICD-10-CM

## 2019-09-11 DIAGNOSIS — C34.2 PRIMARY CANCER OF RIGHT MIDDLE LOBE OF LUNG (HCC): ICD-10-CM

## 2019-09-11 DIAGNOSIS — J44.9 COPD, SEVERE (HCC): Primary | ICD-10-CM

## 2019-09-11 RX ORDER — MORPHINE SULFATE 100 MG/5ML
5-10 SOLUTION ORAL
Qty: 30 ML | Refills: 0
Start: 2019-09-11 | End: 2019-12-10

## 2019-09-12 ENCOUNTER — TELEPHONE (OUTPATIENT)
Dept: INTERNAL MEDICINE CLINIC | Age: 84
End: 2019-09-12

## 2019-09-12 NOTE — TELEPHONE ENCOUNTER
Hospice wants to know if Hospice C2 form was completed and signed and faxed back to hospice Nothing in media

## 2019-09-19 ENCOUNTER — TELEPHONE (OUTPATIENT)
Dept: INTERNAL MEDICINE CLINIC | Age: 84
End: 2019-09-19

## 2019-09-19 NOTE — TELEPHONE ENCOUNTER
[facility] Pt says she took herself off morphine because of side effects irritable,forgetful so would like something else Hospice recommends     Oxycodone2. 5-5 mg q 4 pain or sob

## 2019-09-25 DIAGNOSIS — F41.1 GAD (GENERALIZED ANXIETY DISORDER): ICD-10-CM

## 2019-09-25 RX ORDER — BUSPIRONE HYDROCHLORIDE 15 MG/1
TABLET ORAL
Qty: 135 TABLET | Refills: 0 | Status: SHIPPED | OUTPATIENT
Start: 2019-09-25

## 2019-10-10 ENCOUNTER — TELEPHONE (OUTPATIENT)
Dept: RHEUMATOLOGY | Age: 84
End: 2019-10-10

## 2019-11-20 ENCOUNTER — TELEPHONE (OUTPATIENT)
Dept: INTERNAL MEDICINE CLINIC | Age: 84
End: 2019-11-20

## 2019-11-20 RX ORDER — PROCHLORPERAZINE MALEATE 10 MG
10 TABLET ORAL EVERY 6 HOURS PRN
Qty: 30 TABLET | Refills: 1 | Status: SHIPPED | OUTPATIENT
Start: 2019-11-20

## 2019-11-27 ENCOUNTER — TELEPHONE (OUTPATIENT)
Dept: INTERNAL MEDICINE CLINIC | Age: 84
End: 2019-11-27